# Patient Record
Sex: MALE | Race: WHITE | NOT HISPANIC OR LATINO | Employment: FULL TIME | URBAN - NONMETROPOLITAN AREA
[De-identification: names, ages, dates, MRNs, and addresses within clinical notes are randomized per-mention and may not be internally consistent; named-entity substitution may affect disease eponyms.]

---

## 2018-01-31 ENCOUNTER — OFFICE VISIT CONVERTED (OUTPATIENT)
Dept: FAMILY MEDICINE CLINIC | Age: 38
End: 2018-01-31
Attending: NURSE PRACTITIONER

## 2019-11-19 ENCOUNTER — OFFICE VISIT CONVERTED (OUTPATIENT)
Dept: FAMILY MEDICINE CLINIC | Age: 39
End: 2019-11-19
Attending: FAMILY MEDICINE

## 2019-11-27 ENCOUNTER — CONVERSION ENCOUNTER (OUTPATIENT)
Dept: CARDIOLOGY | Facility: CLINIC | Age: 39
End: 2019-11-27
Attending: INTERNAL MEDICINE

## 2020-11-17 ENCOUNTER — HOSPITAL ENCOUNTER (OUTPATIENT)
Dept: OTHER | Facility: HOSPITAL | Age: 40
Discharge: HOME | End: 2020-11-17
Attending: FAMILY MEDICINE

## 2020-11-17 ENCOUNTER — OFFICE VISIT CONVERTED (OUTPATIENT)
Dept: FAMILY MEDICINE CLINIC | Age: 40
End: 2020-11-17
Attending: FAMILY MEDICINE

## 2020-11-17 LAB
ALBUMIN SERPL-MCNC: 4.2 G/DL (ref 3.5–5)
ALBUMIN/GLOB SERPL: 1.7 {RATIO} (ref 1.4–2.6)
ALP SERPL-CCNC: 55 U/L (ref 53–128)
ALT SERPL-CCNC: 37 U/L (ref 10–40)
ANION GAP SERPL CALC-SCNC: 17 MMOL/L (ref 8–19)
APPEARANCE UR: CLEAR
AST SERPL-CCNC: 32 U/L (ref 15–50)
BACTERIA UR QL AUTO: ABNORMAL
BILIRUB SERPL-MCNC: 0.38 MG/DL (ref 0.2–1.3)
BILIRUB UR QL: NEGATIVE
BUN SERPL-MCNC: 24 MG/DL (ref 5–25)
BUN/CREAT SERPL: 20 {RATIO} (ref 6–20)
CALCIUM SERPL-MCNC: 8.9 MG/DL (ref 8.7–10.4)
CASTS URNS QL MICRO: ABNORMAL /[LPF]
CHLORIDE SERPL-SCNC: 107 MMOL/L (ref 99–111)
CHOLEST SERPL-MCNC: 130 MG/DL (ref 107–200)
CHOLEST/HDLC SERPL: 2.8 {RATIO} (ref 3–6)
COLOR UR: YELLOW
CONV CO2: 21 MMOL/L (ref 22–32)
CONV LEUKOCYTE ESTERASE: NEGATIVE
CONV TOTAL PROTEIN: 6.7 G/DL (ref 6.3–8.2)
CONV UROBILINOGEN IN URINE BY AUTOMATED TEST STRIP: 0.2 {EHRLICHU}/DL (ref 0.1–1)
CREAT UR-MCNC: 1.19 MG/DL (ref 0.7–1.2)
EPI CELLS #/AREA URNS HPF: ABNORMAL /[HPF]
ERYTHROCYTE [DISTWIDTH] IN BLOOD BY AUTOMATED COUNT: 12.4 % (ref 11.5–14.5)
GFR SERPLBLD BASED ON 1.73 SQ M-ARVRAT: >60 ML/MIN/{1.73_M2}
GLOBULIN UR ELPH-MCNC: 2.5 G/DL (ref 2–3.5)
GLUCOSE 24H UR-MCNC: NEGATIVE MG/DL
GLUCOSE SERPL-MCNC: 87 MG/DL (ref 70–99)
HBA1C MFR BLD: 16.3 G/DL (ref 14–18)
HCT VFR BLD AUTO: 47.6 % (ref 42–52)
HDLC SERPL-MCNC: 47 MG/DL (ref 40–60)
HGB UR QL STRIP: NEGATIVE
KETONES UR QL STRIP: NEGATIVE MG/DL
LDLC SERPL CALC-MCNC: 67 MG/DL (ref 70–100)
MCH RBC QN AUTO: 30.3 PG (ref 27–31)
MCHC RBC AUTO-ENTMCNC: 34.2 G/DL (ref 33–37)
MCV RBC AUTO: 88.5 FL (ref 80–96)
MUCOUS THREADS URNS QL MICRO: ABNORMAL
NITRITE UR-MCNC: NEGATIVE MG/ML
OSMOLALITY SERPL CALC.SUM OF ELEC: 295 MOSM/KG (ref 273–304)
PH UR STRIP.AUTO: 7 [PH] (ref 5–8)
PLATELET # BLD AUTO: 172 10*3/UL (ref 130–400)
PMV BLD AUTO: 9.4 FL (ref 7.4–10.4)
POTASSIUM SERPL-SCNC: 4.3 MMOL/L (ref 3.5–5.3)
PROT UR-MCNC: NEGATIVE MG/DL
PSA SERPL-MCNC: 0.74 NG/ML (ref 0–4)
RBC # BLD AUTO: 5.38 10*6/UL (ref 4.7–6.1)
RBC # BLD AUTO: ABNORMAL /[HPF]
SODIUM SERPL-SCNC: 141 MMOL/L (ref 135–147)
SP GR UR STRIP: >=1.03 (ref 1–1.03)
SPECIMEN SOURCE: ABNORMAL
TESTOST SERPL-MCNC: 995 NG/DL (ref 249–836)
TRIGL SERPL-MCNC: 81 MG/DL (ref 40–150)
TSH SERPL-ACNC: 1.76 M[IU]/L (ref 0.27–4.2)
UNIDENT CRYS URNS QL MICRO: ABNORMAL /[HPF]
VLDLC SERPL-MCNC: 16 MG/DL (ref 5–37)
WBC # BLD AUTO: 3.85 10*3/UL (ref 4.8–10.8)
WBC #/AREA URNS HPF: ABNORMAL /[HPF]

## 2020-11-19 ENCOUNTER — HOSPITAL ENCOUNTER (OUTPATIENT)
Dept: OTHER | Facility: HOSPITAL | Age: 40
Discharge: HOME | End: 2020-11-19
Attending: FAMILY MEDICINE

## 2021-01-13 ENCOUNTER — OFFICE VISIT CONVERTED (OUTPATIENT)
Dept: OTOLARYNGOLOGY | Facility: CLINIC | Age: 41
End: 2021-01-13
Attending: OTOLARYNGOLOGY

## 2021-04-01 ENCOUNTER — OFFICE VISIT CONVERTED (OUTPATIENT)
Dept: FAMILY MEDICINE CLINIC | Age: 41
End: 2021-04-01
Attending: NURSE PRACTITIONER

## 2021-05-10 ENCOUNTER — HOSPITAL ENCOUNTER (OUTPATIENT)
Dept: OTHER | Facility: HOSPITAL | Age: 41
Discharge: HOME | End: 2021-05-10
Attending: FAMILY MEDICINE

## 2021-05-10 ENCOUNTER — OFFICE VISIT CONVERTED (OUTPATIENT)
Dept: FAMILY MEDICINE CLINIC | Age: 41
End: 2021-05-10
Attending: FAMILY MEDICINE

## 2021-05-10 LAB
CONV HIV-1/ HIV-2: NONREACTIVE
RPR SER QL: NORMAL
TRICHOMONAS, POC: NORMAL

## 2021-05-10 NOTE — H&P
History and Physical      Patient Name: Latrell Morales   Patient ID: [id number]   Sex: Male   YOB: 1980    Primary Care Provider: Raffi Pierson MD   Referring Provider: Raffi Pierson MD    Visit Date: January 13, 2021    Provider: Mateo Cortez MD   Location: [facility]   Location Address: [address] [place] [address]  [address] [place] [address]  [id number]   Location Phone: [phone]          Chief Complaint     1.  Thyroid nodule       History Of Present Illness  Latrell Morales is a 40 year old /White male who presents to the office today as a consult from Raffi Pierson MD.      He presents the clinic today for evaluation of a thyroid nodule that was discovered on physical exam.  The patient has had no symptoms from this, denies a family history of thyroid issues or cancer, and has never had any radiation exposure.  He denies any throat discomfort, voice changes, or difficulty or pain with swallowing.  An ultrasound was obtained and I reviewed this with him today.  This shows a normal-sized thyroid with a 6 mm nodule along the right side of the isthmus which was rated as a T RADS 3 or 4 lesion.  Recommendation was made for repeat ultrasound in 1 year.  His TSH levels were within normal limits on recent blood work.  The patient denies any other ear nose and throat issues.       Past Medical History  Thyroid nodule         Past Surgical History  Knee surgery         Allergy List  Latex         Family Medical History  *No Known Family History         Social History  Tobacco (Never)         Review of Systems  · Constitutional  o Denies  o : fever, night sweats, weight loss  · Eyes  o Denies  o : discharge from eye, impaired vision  · HENT  o Admits  o : *See HPI  · Cardiovascular  o Denies  o : chest pain, irregular heart beats  · Respiratory  o Denies  o : shortness of breath, wheezing, coughing up blood  · Gastrointestinal  o Denies  o : heartburn, reflux,  "vomiting blood  · Genitourinary  o Denies  o : frequency  · Integument  o Denies  o : rash, skin dryness  · Neurologic  o Denies  o : seizures, loss of balance, loss of consciousness, dizziness  · Endocrine  o Denies  o : cold intolerance, heat intolerance  · Heme-Lymph  o Denies  o : easy bleeding, anemia      Vitals  Date Time BP Position Site L\R Cuff Size HR RR TEMP (F) WT  HT  BMI kg/m2 BSA m2 O2 Sat FR L/min FiO2 HC       01/13/2021 10:57 AM        97.6 234lbs 2oz 5'  9.5\" 34.08 2.28             Physical Examination  · Constitutional  o Appearance  o : well developed, well-nourished, alert and in no acute distress, voice clear and strong  · Head and Face  o Head  o :   § Inspection  § : no deformities or lesions  o Face  o :   § Inspection  § : No facial lesions; House-Brackmann I/VI bilaterally  § Palpation  § : No TMJ crepitus nor  muscle tenderness bilaterally  · Eyes  o Vision  o :   § Visual Fields  § : Extraocular movements are intact. No spontaneous or gaze-induced nystagmus.  o Conjunctivae  o : clear  o Sclerae  o : clear  o Pupils and Irises  o : pupils equal, round, and reactive to light.   · Ears, Nose, Mouth and Throat  o Ears  o :   § External Ears  § : appearance within normal limits, no lesions present  § Otoscopic Examination  § : tympanic membrane appearance within normal limits bilaterally without perforations, well-aerated middle ears  § Hearing  § : intact to conversational voice both ears  o Nose  o :   § External Nose  § : appearance normal  § Intranasal Exam  § : mucosa within normal limits, vestibules normal, no intranasal lesions present, septum midline, sinuses non tender to percussion  o Oral Cavity  o :   § Oral Mucosa  § : oral mucosa normal without pallor or cyanosis  § Lips  § : lip appearance normal  § Teeth  § : normal dentition for age  § Gums  § : gums pink, non-swollen, no bleeding present  § Tongue  § : tongue appearance normal; normal mobility  § Palate  § : " hard palate normal, soft palate appearance normal with symmetric mobility  o Throat  o :   § Oropharynx  § : no inflammation or lesions present, tonsils within normal limits  § Hypopharynx  § : appearance within normal limits, superior epiglottis within normal limits  § Larynx  § : appearance within normal limits, vocal cords within normal limits, no lesions present  · Neck  o Inspection/Palpation  o : normal appearance, no masses or tenderness, trachea midline; thyroid size normal, nontender, nodule small, soft  · Respiratory  o Respiratory Effort  o : breathing unlabored  o Inspection of Chest  o : normal appearance, no retractions  · Cardiovascular  o Heart  o : regular rate and rhythm  · Lymphatic  o Neck  o : no lymphadenopathy present  o Supraclavicular Nodes  o : no lymphadenopathy present  o Preauricular Nodes  o : no lymphadenopathy present  · Skin and Subcutaneous Tissue  o General Inspection  o : Regarding face and neck - there are no rashes present, no lesions present, and no areas of discoloration  · Neurologic  o Cranial Nerves  o : cranial nerves II-XII are grossly intact bilaterally  o Gait and Station  o : normal gait, able to stand without diffculty  · Psychiatric  o Judgement and Insight  o : judgment and insight intact  o Mood and Affect  o : mood normal, affect appropriate          Assessment  · Thyroid nodule     241.0/E04.1      Plan  · Orders  o Ultrasound Thyroid. (99861) - 241.0/E04.1 - 01/13/2022  · Medications  o Medications have been Reconciled  o Transition of Care or Provider Policy  · Instructions  o He presents the clinic today for evaluation of a thyroid nodule that was discovered on physical exam. The patient has had no symptoms from this, denies a family history of thyroid issues or cancer, and has never had any radiation exposure. He denies any throat discomfort, voice changes, or difficulty or pain with swallowing. An ultrasound was obtained and I reviewed this with him today.  This shows a normal-sized thyroid with a 6 mm nodule along the right side of the isthmus which was rated as a T RADS 3 or 4 lesion. Recommendation was made for repeat ultrasound in 1 year. His TSH levels were within normal limits on recent blood work. On exam, the nodule is soft and nontender. In discussing this with him I recommended ultrasound to be repeated in 1 year, and have ordered this for him. I have asked him to do self exams, and to contact me should there be any changes. If the ultrasound is stable, the nodule can simply be followed with several ultrasounds a year apart, or I will be glad to see him back in the clinic if there are any changes.  o Electronically Identified Patient Education Materials Provided Electronically  · Correspondence  o ENT Letter to Referring MD (Raffi Pierson MD) - 01/13/2021            Electronically Signed by: Mateo Cortez MD -Author on January 13, 2021 11:32:27 AM

## 2021-05-11 LAB
CONV HEPATITIS B SURFACE AG W CONFIRMATION RE: NEGATIVE
HAV IGM SERPL QL IA: NEGATIVE
HBV CORE IGM SERPL QL IA: NEGATIVE
HCV AB SER DONR QL: <0.1 S/CO RATIO (ref 0–0.9)
HSV1 IGG SER IA-ACNC: <0.91 INDEX (ref 0–0.9)
HSV2 IGG SER IA-ACNC: <0.91 INDEX (ref 0–0.9)

## 2021-05-13 LAB
C TRACH RRNA CVX QL NAA+PROBE: NEGATIVE
N GONORRHOEA DNA SPEC QL NAA+PROBE: NEGATIVE

## 2021-05-14 VITALS — BODY MASS INDEX: 34.67 KG/M2 | WEIGHT: 234.12 LBS | TEMPERATURE: 97.6 F | HEIGHT: 69 IN

## 2021-05-18 NOTE — PROGRESS NOTES
Latrell Morales  1980     Office/Outpatient Visit    Visit Date:  01:25 pm    Provider: Raffi Pierson MD (Assistant: Marjorie Lion MA)    Location: [facility]        Electronically signed by Raffi Pierson MD on  2020 06:30:55 AM                             Subjective:        CC: physical exam    HPI:       Latrell is in today for wellness exam.  He is 40 years old and works for [employer] where he has been for 22 years.  He does not smoke.  He uses minimal alcohol.  He does not take any routine medications.  He did have partial knee replacement last year.    ROS:     CONSTITUTIONAL:  Negative for chills and fever.      CARDIOVASCULAR:  Negative for chest pain and palpitations.      RESPIRATORY:  Negative for recent cough and dyspnea.      GASTROINTESTINAL:  Negative for abdominal pain, nausea and vomiting.      INTEGUMENTARY:  Negative for atypical mole(s) and rash.          Past Medical History / Family History / Social History:         Last Reviewed on 2020 01:32 PM by Raffi Pierson    Past Medical History:     UNREMARKABLE         PREVENTIVE HEALTH MAINTENANCE             COLONOSCOPY: has never been done and has no medical need for one at this time     EYE EXAM: was last done 2016 glasses, [facility]     INFLUENZA VACCINE: has never been done declines         Surgical History:         Positive for    Vasectomy;     Positive for    L KNEE Xfive;; Titanium plate and screws         Family History:     Father:  at age 50; Cause of death was suicide     Mother: Healthy     Sister(s): Healthy; 1 sister(s) total     Paternal Grandfather:  at age 64; Cause of death was MI     Paternal Grandmother:  at age 80's     Maternal Grandfather:  at age 60's; Cause of death was CHF     Maternal Grandmother: Coronary Artery Disease         Social History:     Occupation: [employer]     Marital Status:      Children: 2 children      Hobbies/Recreation: he enjoys weight lifting;         Tobacco/Alcohol/Supplements:     Last Reviewed on 11/17/2020 01:32 PM by Raffi Pierson    Tobacco: He has never smoked.          Alcohol: Frequency:    RARE;         Substance Abuse History:     Last Reviewed on 11/17/2020 01:32 PM by Raffi Pierson    None         Mental Health History:     Last Reviewed on 11/17/2020 01:32 PM by Raffi Pierson    NEGATIVE         Communicable Diseases (eg STDs):     Last Reviewed on 11/17/2020 01:32 PM by Raffi Pierson    Reportable health conditions; NEGATIVE         Current Problems:     Last Reviewed on 11/17/2020 01:32 PM by Raffi Pierson    Abnormal electrocardiogram [ECG] [EKG]    Encounter for general adult medical examination with abnormal findings        Immunizations:     Boostrix (Tdap) 5/3/2017        Allergies:     Last Reviewed on 11/17/2020 01:32 PM by Raffi Pierson    No Known Allergies.        Current Medications:     Last Reviewed on 11/17/2020 01:32 PM by Raffi Pierson    None        Objective:        Vitals:         Current: 11/17/2020 1:28:41 PM    Ht:  5 ft, 9 in;  Wt: 226.4 lbs;  BMI: 33.4T: 97 F (temporal);  BP: 131/76 mm Hg (left arm, sitting);  P: 80 bpm (left arm (BP Cuff), sitting);  sCr: 1.14 mg/dL;  GFR: 90.53        Exams:     PHYSICAL EXAM:     GENERAL: Vitals recorded well developed, well nourished;     EYES: extraocular movements intact; conjunctiva and cornea are normal; PERRL;     E/N/T: EARS:  normal external auditory canals and tympanic membranes;  grossly normal hearing; OROPHARYNX:  normal mucosa, dentition, gingiva, and posterior pharynx;     NECK: range of motion is normal; thyroid exam reveals left lobe enlargement;     RESPIRATORY: normal respiratory rate and pattern with no distress; normal breath sounds with no rales, rhonchi, wheezes or rubs;     CARDIOVASCULAR: normal rate; rhythm is regular;  no systolic murmur;      GASTROINTESTINAL: nontender; normal bowel sounds; no masses;     LYMPHATIC: no enlargement of cervical or facial nodes; no supraclavicular nodes;     SKIN:  no significant rashes or lesions; no suspicious moles;     NEUROLOGIC: mental status: alert and oriented x 3; cranial nerves II-XII grossly intact;     PSYCHIATRIC: appropriate affect and demeanor; normal psychomotor function;         Assessment:         Z00.00   Encounter for general adult medical examination without abnormal findings       E04.9   Nontoxic goiter, unspecified           ORDERS:         Radiology/Test Orders:       [id number]  US, soft tissues of head & neck (eg, thyroid, parathyroid, parotid), real time w/image documentation  (Send-Out)              Lab Orders:       [id number]  BD2 - [facility] CBC w/o diff  (Send-Out)            63597  COMP [facility] Comp. Metabolic Panel  (Send-Out)            *  [employer] Medicare screening PSA  (Send-Out)            76967  TSH - [facility] TSH  (Send-Out)            75943  LPDP - [facility] Lipid Panel  (Send-Out)            40121  TESTB - [facility] Testosterone, total  (Send-Out)            00313  BDUAM - [facility] Urinalysis, automated, with micro  (Send-Out)                      Plan:         Encounter for general adult medical examination without abnormal findings    LABORATORY:  Labs ordered to be performed today include CBC W/O DIFF, Comprehensive metabolic panel, lipid panel, PSA, Testosterone total, TSH, and urinalysis with micro.      RECOMMENDATIONS given include: Latrell is in good health overall.  I'm going to advise no specific changes.  We will update labs for him as noted below and follow from there.  He is in excellent overall health.  The thyroid is to be checked as noted..  MIPS Vaccines Flu and Pneumonia updated in Shot record           Orders:       [id number]  [facility] - [facility] CBC w/o diff  (Send-Out)            42478  COMP [facility] Comp. Metabolic Panel  (Send-Out)            *  [employer] Medicare screening PSA  (Send-Out)            31989   TSH - [facility] TSH  (Send-Out)            66864  LPDP - [facility] Lipid Panel  (Send-Out)            19992  TESTB - [facility] Testosterone, total  (Send-Out)            09381  BDUAM - [facility] Urinalysis, automated, with micro  (Send-Out)              Nontoxic goiter, unspecified        RADIOLOGY:  I have ordered Thyroid Ultrasound to be done today.            Orders:       [id number]  US, soft tissues of head & neck (eg, thyroid, parathyroid, parotid), real time w/image documentation  (Send-Out)                  Charge Capture:         Primary Diagnosis:     Z00.00  Encounter for general adult medical examination without abnormal findings           Orders:      70994  Preventive medicine, established patient, age 40-64 years  (In-House)              E04.9  Nontoxic goiter, unspecified

## 2021-05-18 NOTE — PROGRESS NOTES
Latrell Morales PEPE  1980     Office/Outpatient Visit    Visit Date: Mon, May 10, 2021 12:59 pm    Provider: Raffi Pierson MD (Assistant: Lucille Shore RN)    Location: [facility]        Electronically signed by Raffi Pierson MD on  05/12/2021 01:30:00 PM                             Subjective:        CC: anxiety    HPI:       Latrell is in today for evaluation of anxiety and some depression.  He has been through a personal struggle and may be looking at a divorce.  They have been  for 18 years and have two children - 14 and 16 years old.  He says that he has anger issues and some issues with crying and feeling down.  He feels 'all over the place'.  He did have some really bad days there for a while.  He says that his father did commit suicide.  Latrell did not seriously consider suicide, but he had some 'really low days'.  He is not sleeping well.          Latrell has noted a bump on the undersurface of his penis.  He says that this was painful initially.  It almost seemed like a 'blood blister'.  He has never had any kind of sexually transmitted infection.  He is without acute concern otherwise.    ROS:     CONSTITUTIONAL:  Negative for chills and fever.      CARDIOVASCULAR:  Negative for chest pain and palpitations.      RESPIRATORY:  Negative for recent cough and dyspnea.      GASTROINTESTINAL:  Negative for abdominal pain, nausea and vomiting.      INTEGUMENTARY:  Negative for atypical mole(s) and rash.          Past Medical History / Family History / Social History:         Last Reviewed on 5/10/2021 01:25 PM by Raffi Pierson    Past Medical History:     UNREMARKABLE         PREVENTIVE HEALTH MAINTENANCE             COLONOSCOPY: has never been done and has no medical need for one at this time     EYE EXAM: was last done 2016 glasses, [facility]     INFLUENZA VACCINE: has never been done declines         Surgical History:         Positive for    Vasectomy;     Positive for     L KNEE Xfive;; Titanium plate and screws         Family History:     Father:  at age 50; Cause of death was suicide     Mother: Healthy     Sister(s): Healthy; 1 sister(s) total     Paternal Grandfather:  at age 64; Cause of death was MI     Paternal Grandmother:  at age 80's     Maternal Grandfather:  at age 60's; Cause of death was CHF     Maternal Grandmother: Coronary Artery Disease         Social History:     Occupation: [employer]     Marital Status:      Children: 2 children     Hobbies/Recreation: he enjoys weight lifting;         Tobacco/Alcohol/Supplements:     Last Reviewed on 5/10/2021 01:25 PM by Raffi Pierson    Tobacco: He has never smoked.          Alcohol: Frequency:    RARE;         Substance Abuse History:     Last Reviewed on 5/10/2021 01:25 PM by Raffi Pierson    None         Mental Health History:     Last Reviewed on 5/10/2021 01:25 PM by Raffi Pierson    NEGATIVE         Communicable Diseases (eg STDs):     Last Reviewed on 5/10/2021 01:25 PM by Raffi Pierson    Reportable health conditions; NEGATIVE         Current Problems:     Last Reviewed on 5/10/2021 01:25 PM by Raffi Pierson    Abnormal electrocardiogram [ECG] [EKG]    Encounter for general adult medical examination with abnormal findings    Encounter for general adult medical examination without abnormal findings    Nontoxic goiter, unspecified    Abnormal level of hormones in specimens from other organs, systems and tissues    Insomnia, unspecified        Immunizations:     Boostrix (Tdap) 5/3/2017        Allergies:     Last Reviewed on 5/10/2021 01:25 PM by Raffi Pierson    No Known Allergies.        Current Medications:     Last Reviewed on 5/10/2021 01:25 PM by Raffi Pierson    hydrOXYzine HCL 25 mg oral tablet [take 1or 2 tablets q hs prn insomnia or anxiety]        Objective:        Vitals:         Current: 5/10/2021 1:03:39 PM    Ht:  5  ft, 9 in;  Wt: 215.4 lbs;  BMI: 31.8T: 97.3 F (temporal);  BP: 117/72 mm Hg (right arm, sitting);  P: 97 bpm (right arm (BP Cuff), sitting);  sCr: 1.19 mg/dL;  GFR: 84.91        Exams:     PHYSICAL EXAM:     GENERAL: Vitals recorded well developed, well nourished;     EYES: extraocular movements intact; conjunctiva and cornea are normal; PERRL;     NECK: range of motion is normal; thyroid is non-palpable;     RESPIRATORY: normal respiratory rate and pattern with no distress; normal breath sounds with no rales, rhonchi, wheezes or rubs;     CARDIOVASCULAR: normal rate; rhythm is regular;  no systolic murmur;     GASTROINTESTINAL: nontender; normal bowel sounds; no masses;     LYMPHATIC: no enlargement of cervical or facial nodes; no supraclavicular nodes;     SKIN: there is a single ulcer noted on the shaft of the penis - there is no other rash noted;     NEUROLOGIC: mental status: alert and oriented x 3; cranial nerves II-XII grossly intact;     PSYCHIATRIC: appropriate affect and demeanor; normal psychomotor function;         Assessment:         F41.8   Other specified anxiety disorders       Z72.51   High risk heterosexual behavior           ORDERS:         Meds Prescribed:       [New Rx] sertraline 50 mg oral tablet [take 1 tablet (50 mg) by oral route once daily], #30 (thirty) tablets, Refills: 1 (one)       [New Rx] traZODone 50 mg oral tablet [take 1 tablet (50 mg) by oral route once daily at bedtime], #30 (thirty) tablets, Refills: 0 (zero)         Lab Orders:       [id number]  AHEP4 - [facility] Hepatitis Panel (HAAb, HbcAB, HbsAG, Hep C antibody)  (Send-Out)            70531  HIV - [facility] HIV-1 and HIV-2 Ab  (Send-Out)            40038  RPR - H RPR, Rfx Qn RPR/Confirm TP  (Send-Out)            19682  CTNGX- [facility] Chlamydia GC swab or urine (27226, 72260)  (Send-Out)            56595  HSVIG -[facility]- Herpes simplex IgG1&@ 51185 and 87002  (Send-Out)            24549  BDUTR - [facility] Urine Trichonomas  (Send-Out)                       Plan:         Other specified anxiety disorders        RECOMMENDATIONS given include: This has been a challenging situation with Latrell.  I am going to recommend daily sertraline for him as noted below.  Hopefully, this will pull his overall anxiety level.  We will also give some trazodone for night time use.  Also, he is concerned by this spot that developed on the penis.  We will move forward with additional testing as noted below given the concern.  We may also repeat testing in the next 90 days..            Prescriptions:       [New Rx] sertraline 50 mg oral tablet [take 1 tablet (50 mg) by oral route once daily], #30 (thirty) tablets, Refills: 1 (one)       [New Rx] traZODone 50 mg oral tablet [take 1 tablet (50 mg) by oral route once daily at bedtime], #30 (thirty) tablets, Refills: 0 (zero)         High risk heterosexual behavior    LABORATORY:  Labs ordered to be performed today include STD Testing: Hepatitis panel HIV I and HIV 2 Ab RPR Chlamydia/GC H Herpes type I and II IgG index value on each type Urine Trichomonas.            Orders:       [id number]  AHEP4 - [facility] Hepatitis Panel (HAAb, HbcAB, HbsAG, Hep C antibody)  (Send-Out)            39338  HIV - [facility] HIV-1 and HIV-2 Ab  (Send-Out)            10519  RPR - H RPR, Rfx Qn RPR/Confirm TP  (Send-Out)            79127  CTNGX- [facility] Chlamydia GC swab or urine (97059, 74316)  (Send-Out)            96921  HSVIG -[facility]- Herpes simplex IgG1&@ 07580 and 42718  (Send-Out)            11528  BDUTR - [facility] Urine Trichonomas  (Send-Out)                  Charge Capture:         Primary Diagnosis:     F41.8  Other specified anxiety disorders           Orders:      02777  Office/outpatient visit; established patient, level 4  (In-House)              Z72.51  High risk heterosexual behavior

## 2021-05-18 NOTE — PROGRESS NOTES
Latrell Morales  1980     Office/Outpatient Visit    Visit Date: u, Apr 1, 2021 09:39 am    Provider: Gris Vyas N.P. (Assistant: Adia Calabrese LPN)    Location: [facility]        Electronically signed by Gris Vyas N.P. on  04/01/2021 08:29:12 PM                             Subjective:        CC: Mr. Morales is a 40 year old White male.  He presents with insomnia.          HPI:           Insomnia, unspecified noted.  This has been noted for the past 3 weeks.  Sleep has been disrupted by a delay in initiation of sleep and frequent awakenings.  On average, he estimates that he gets 2-3 hours of sleep per night.  Associated symptoms include anxiety.  He denies symptoms of agitation, depression or manic symptoms.  Current stressors include marital discord, shift change.  Medical history is positive for anxiety/stress.  Current medications include melatonin.  Remedies that he has already tried include OTC sleep aids.      ROS:     CONSTITUTIONAL:  Positive for fatigue.   Negative for chills or fever.      CARDIOVASCULAR:  Negative for chest pain, palpitations, tachycardia, orthopnea, and edema.      RESPIRATORY:  Negative for cough, dyspnea, and hemoptysis.      MUSCULOSKELETAL:  Negative for arthralgias, back pain, and myalgias.      PSYCHIATRIC:  Positive for anxiety, feelings of stress and insomnia.   Negative for depression or suicidal thoughts.          Past Medical History / Family History / Social History:         Last Reviewed on 4/01/2021 09:45 AM by Gris Vyas    Past Medical History:     UNREMARKABLE         PREVENTIVE HEALTH MAINTENANCE             COLONOSCOPY: has never been done and has no medical need for one at this time     EYE EXAM: was last done 2016 glasses, [facility]     INFLUENZA VACCINE: has never been done declines         Surgical History:         Positive for    Vasectomy;     Positive for    L KNEE Xfive;; Titanium plate and screws          Family History:     Father:  at age 50; Cause of death was suicide     Mother: Healthy     Sister(s): Healthy; 1 sister(s) total     Paternal Grandfather:  at age 64; Cause of death was MI     Paternal Grandmother:  at age 80's     Maternal Grandfather:  at age 60's; Cause of death was CHF     Maternal Grandmother: Coronary Artery Disease         Social History:     Occupation: [employer]     Marital Status:      Children: 2 children     Hobbies/Recreation: he enjoys weight lifting;         Tobacco/Alcohol/Supplements:     Last Reviewed on 2021 09:39 AM by Adia Calabrese    Tobacco: He has never smoked.          Alcohol: Frequency:    RARE;         Substance Abuse History:     Last Reviewed on 2020 01:32 PM by Raffi Pierson    None         Mental Health History:     Last Reviewed on 2020 01:32 PM by Raffi Pierson    NEGATIVE         Communicable Diseases (eg STDs):     Last Reviewed on 2020 01:32 PM by Raffi Pierson    Reportable health conditions; NEGATIVE         Current Problems:     Last Reviewed on 2021 08:28 PM by Gris Vyas    Abnormal electrocardiogram [ECG] [EKG]    Encounter for general adult medical examination with abnormal findings    Encounter for general adult medical examination without abnormal findings    Nontoxic goiter, unspecified    Abnormal level of hormones in specimens from other organs, systems and tissues    Insomnia, unspecified        Immunizations:     Boostrix (Tdap) 5/3/2017        Allergies:     Last Reviewed on 2020 01:32 PM by Raffi Pierson    No Known Allergies.        Current Medications:     Last Reviewed on 2021 09:39 AM by Adia Calabrese    None        Objective:        Vitals:         Historical:     2020  BP:   131/76 mm Hg ( (left arm, , sitting, );) 2020  Wt:   226.4lbs    Current: 2021 9:41:08 AM    Ht:  5 ft, 9 in;  Wt: 222.8 lbs;   BMI: 32.9T: 96.4 F (temporal);  BP: 136/67 mm Hg (left arm, sitting);  P: 95 bpm (left arm (BP Cuff), sitting);  sCr: 1.19 mg/dL;  GFR: 86.14        Exams:     PHYSICAL EXAM:     GENERAL:  well developed and nourished; appropriately groomed; in no apparent distress;     NECK: thyroid is non-palpable;     RESPIRATORY: normal respiratory rate and pattern with no distress; normal breath sounds with no rales, rhonchi, wheezes or rubs;     CARDIOVASCULAR: normal rate; rhythm is regular;     MUSCULOSKELETAL:  Normal range of motion, strength and tone;     NEUROLOGIC: mental status: alert and oriented x 3; GROSSLY INTACT     PSYCHIATRIC:  appropriate affect and demeanor; normal speech pattern; grossly normal memory;         Assessment:         G47.00   Insomnia, unspecified           ORDERS:         Meds Prescribed:       [New Rx] hydrOXYzine HCL 25 mg oral tablet [take 1or 2 tablets q hs prn insomnia or anxiety], #60 (sixty) tablets, Refills: 1 (one)                 Plan:         Insomnia, unspecified        RECOMMENDATIONS given include: avoid alcohol, avoid caffeine, adhere to a proper sleep hygiene schedule, reduce stress, and here with wife.  they are attending counseling which is a good idea.  follow up if not improivng..            Prescriptions:       [New Rx] hydrOXYzine HCL 25 mg oral tablet [take 1or 2 tablets q hs prn insomnia or anxiety], #60 (sixty) tablets, Refills: 1 (one)             Patient Recommendations:        For  Insomnia, unspecified:    Avoid or reduce consumption of alcohol.  Although you may initially feel sleepy after a drinking, this effect wears off and can lead to night-time awakening. Avoid caffeine as much as possible.  Quit all sources of caffeine, if possible; otherwise, do not drink coffee, tea, or sodas past the early afternoon hours. Avoid naps.  Go to bed only when tired.  Use the bed only for sleep and sex- not for eating, TV watching, or other activities.  Maintain a regular sleep  and wake schedule. Stress and anxiety frequently interfere with sleep.  Try exercise, meditation, biofeedback, or counseling to help reduce stress.  Call to discuss anti-anxiety medications if these methods do not help.              Charge Capture:         Primary Diagnosis:     G47.00  Insomnia, unspecified           Orders:      [id number]  Office/outpatient visit; established patient, level 3  (In-House)

## 2021-05-18 NOTE — PROGRESS NOTES
Latrell Morales  1980     Office/Outpatient Visit    Visit Date: Tue, Nov 19, 2019 01:08 pm    Provider: Raffi Pierson MD (Assistant: Lucille Shore RN)    Location: [facility]        Electronically signed by Raffi Pierson MD on  11/21/2019 12:25:11 PM                             Subjective:        CC: physical exam prior to surgery    HPI:       Latrell is in today for wellness exam prior to surgery.  He is scheduled to have L partial knee replacement on December 10.  He had some preop testing and was referred her for guidance.  His blood work is basically normal.  His hemoglobin level is mildly elevated.  He has used testosterone off and on for about 20 years.  He is currently using testosterone at about 300mg per week.  He did have some EKG abnormalities on recent evaluation including some NS T wave inversion in the inferior leads.  The QRS complexes are also a little prominent.  He is not having obvious cardiac related symptoms.  He denies significant shortness of breath.  He says that his cardio is not as good as it used to be.      ROS:     CONSTITUTIONAL:  Negative for chills and fever.      EYES:  Negative for blurred vision and eye pain.      E/N/T:  Negative for nasal congestion and sore throat.      CARDIOVASCULAR:  Negative for chest pain and palpitations.      RESPIRATORY:  Negative for recent cough and dyspnea.      GASTROINTESTINAL:  Negative for abdominal pain, nausea and vomiting.      GENITOURINARY:  Negative for dysuria and hematuria.      MUSCULOSKELETAL:  Positive for arthralgias (L knee).   Negative for myalgias.      INTEGUMENTARY:  Negative for atypical mole(s) and rash.      NEUROLOGICAL:  Negative for paresthesias and weakness.      HEMATOLOGIC/LYMPHATIC:  Negative for easy bruising and excessive bleeding.      ALLERGIC/IMMUNOLOGIC:  Negative for seasonal allergies and perennial allergies.      PSYCHIATRIC:  Negative for anxiety and depression.          Past  Medical History / Family History / Social History:         Last Reviewed on 2019 01:41 PM by Raffi Pierson    Past Medical History:     UNREMARKABLE         PREVENTIVE HEALTH MAINTENANCE             COLONOSCOPY: has never been done and has no medical need for one at this time     EYE EXAM: was last done 2016 glasses, [facility]     INFLUENZA VACCINE: has never been done declines         Surgical History:         Positive for    Vasectomy;     Positive for    L KNEE Xfive;; Titanium plate and screws         Family History:     Father:  at age 50; Cause of death was suicide     Mother: Healthy     Sister(s): Healthy; 1 sister(s) total     Paternal Grandfather:  at age 64; Cause of death was MI     Paternal Grandmother:  at age 80's     Maternal Grandfather:  at age 60's; Cause of death was CHF     Maternal Grandmother: Coronary Artery Disease         Social History:     Occupation: [employer]     Marital Status:      Children: 2 children     Hobbies/Recreation: he enjoys weight lifting;         Tobacco/Alcohol/Supplements:     Last Reviewed on 2019 01:41 PM by Raffi Pierson    Tobacco: He has never smoked.          Alcohol: Frequency:    RARE;         Substance Abuse History:     Last Reviewed on 2019 01:41 PM by Raffi Pierson    None         Mental Health History:     Last Reviewed on 2019 01:41 PM by Raffi Pierson    NEGATIVE         Communicable Diseases (eg STDs):     Last Reviewed on 2019 01:41 PM by Raffi Pierson    Reportable health conditions; NEGATIVE         Current Problems:     Last Reviewed on 2019 01:41 PM by Raffi Pierson    None Recorded        Immunizations:     Boostrix (Tdap) 5/3/2017        Allergies:     Last Reviewed on 2019 01:41 PM by Raffi Pierson    No Known Allergies.        Current Medications:     Last Reviewed on 2019 01:41 PM by Raffi Pierson  Rajesh Bradford        Objective:        Vitals:         Current: 11/19/2019 1:12:36 PM    Ht:  5 ft, 9 in;  Wt: 240.6 lbs;  BMI: 35.5T: 98.2 F (oral);  BP: 126/75 mm Hg (left arm, sitting);  P: 75 bpm (left arm (BP Cuff), sitting);  sCr: 1.14 mg/dL;  GFR: 93.81        Exams:     PHYSICAL EXAM:     GENERAL: Vitals recorded well developed, well nourished;     EYES: extraocular movements intact; conjunctiva and cornea are normal; PERRL;     E/N/T: EARS:  normal external auditory canals and tympanic membranes;  grossly normal hearing; OROPHARYNX:  normal mucosa, dentition, gingiva, and posterior pharynx;     NECK: range of motion is normal; thyroid is non-palpable;     RESPIRATORY: normal respiratory rate and pattern with no distress; normal breath sounds with no rales, rhonchi, wheezes or rubs;     CARDIOVASCULAR: normal rate; rhythm is regular;  no systolic murmur;     GASTROINTESTINAL: nontender; normal bowel sounds; no masses;     LYMPHATIC: no enlargement of cervical or facial nodes; no supraclavicular nodes;     SKIN:  no significant rashes or lesions; no suspicious moles;     NEUROLOGIC: mental status: alert and oriented x 3; cranial nerves II-XII grossly intact;     PSYCHIATRIC: appropriate affect and demeanor; normal psychomotor function;         Assessment:         Z00.01   Encounter for general adult medical examination with abnormal findings       R94.31   Abnormal electrocardiogram [ECG] [EKG]           ORDERS:         Radiology/Test Orders:       [id number]  Echocardiography, transthoracic, real-time w image (2D), w M-mode, w spectral & color flow Doppler  (Send-Out)              Other Orders:         Depression screen negative  (In-House)                      Plan:         Encounter for general adult medical examination with abnormal findings        RECOMMENDATIONS given include: Today, we have reviewed Latrell's care.  We will assess his heart as noted below.  I do think echo is needed due to some findings  on EKG that might suggest left sided hypertrophy.  He does not have symptoms that would indicate heart artery blockage, but with the testosterone use, we need to make sure the heart function looks good.  We will follow from there.  We did review the potential side effects of testosterone use including heart disease, stroke, and blood clots as he ages.  There are multiple other risks as well..  MIPS PHQ-9 Depression Screening: Completed form scanned and in chart; Total Score 2; Negative Depression Screen           Orders:         Depression screen negative  (In-House)                Patient Education Handouts:       Physical Exam 30-39 year, Male          Abnormal electrocardiogram [ECG] [EKG]        TESTS/PROCEDURES:  Will proceed with ECHO to be performed/scheduled now.            Orders:       [id number]  Echocardiography, transthoracic, real-time w image (2D), w M-mode, w spectral & color flow Doppler  (Send-Out)                  Charge Capture:         Primary Diagnosis:     Z00.01  Encounter for general adult medical examination with abnormal findings           Orders:      [id number]  Preventive medicine, established patient, age 18-39 years  (In-House)              Depression screen negative  (In-House)              R94.31  Abnormal electrocardiogram [ECG] [EKG]

## 2021-05-18 NOTE — PROGRESS NOTES
Latrell MoralesOtoniel 1980     Office/Outpatient Visit    Visit Date:  08:58 am    Provider: Karissa Wilson N.P. (Assistant: Lucille Shore RN)    Location: [facility]        Electronically signed by Karissa Wilson N.P. on  2018 09:35:15 AM                             SUBJECTIVE:        CC:     Mr. Morales is a 37 year old White male.  Sinus congestion and pressure         HPI:         Patient to be evaluated for acute sinusitis, other.  This has been a problem for the past 3 days.  The pattern of symptoms is described as episodic, with unpredictable symptomatic periods.  His primary symptoms include dry cough, facial pressure, headache, nasal congestion and teeth pain and foul sinus odor.  He denies fever.  He has already tried an antihistamine and guaifenesin.  Pertinent medical history is unremarkable.      ROS:     CONSTITUTIONAL:  Negative for chills, fatigue, fever and weight change.      E/N/T:  Positive for nasal congestion and sinus pressure.   Negative for sore throat.      CARDIOVASCULAR:  Negative for chest pain, orthopnea, paroxysmal nocturnal dyspnea and pedal edema.      RESPIRATORY:  Positive for recent cough ( with scant amounts of clear or white sputum ).   Negative for dyspnea.      GASTROINTESTINAL:  Negative for abdominal pain, heartburn, constipation, diarrhea, and stool changes.          PMH/FMH/SH:     Last Reviewed on 2018 09:27 AM by Karissa Wilson    Past Medical History:     UNREMARKABLE         PREVENTIVE HEALTH MAINTENANCE             COLONOSCOPY: has never been done and has no medical need for one at this time     EYE EXAM: was last done 2016 glasses, [facility]     INFLUENZA VACCINE: has never been done declines         Surgical History:         Positive for    Vasectomy;     Positive for    L KNEE Xfive;; Titanium plate and screws         Family History:     Father:  at age 50; Cause of death was suicide     Mother: Healthy      Sister(s): Healthy; 1 sister(s) total     Paternal Grandfather:  at age 64; Cause of death was MI     Paternal Grandmother:  at age 80's     Maternal Grandfather:  at age 60's; Cause of death was CHF     Maternal Grandmother: Coronary Artery Disease         Social History:     Occupation: [employer]     Marital Status:      Children: 2 children     Hobbies/Recreation: he enjoys weight lifting;         Tobacco/Alcohol/Supplements:     Last Reviewed on 2018 09:27 AM by Karissa Wilson    Tobacco: He has never smoked.          Alcohol: Frequency:    RARE;             Current Problems:     Last Reviewed on 2018 09:27 AM by Karissa Wilson    Abnormal laboratory test findings without diagnosis     Acute sinusitis, other         Immunizations:     Boostrix (Tdap) 5/3/2017         Allergies:     Last Reviewed on 2018 09:27 AM by Karissa Wilson      No Known Drug Allergies.         Current Medications:     Last Reviewed on 2018 09:27 AM by Karissa Wilson      No Known Medications.         OBJECTIVE:        Vitals:         Current: 2018 9:02:07 AM    Ht:  5 ft, 9 in;  Wt: 233 lbs;  BMI: 34.4    T: 97.2 F (oral);  BP: 134/77 mm Hg (right arm, sitting);  P: 96 bpm (right arm (BP Cuff), sitting);  sCr: 1.14 mg/dL;  GFR: 94.33        Exams:     PHYSICAL EXAM:     GENERAL: Vitals recorded well developed, well nourished;  well groomed;  no apparent distress;     E/N/T: NOSE: turbinates are moderately swollen bilaterally;  bilateral maxillary sinus tenderness present;     RESPIRATORY: normal respiratory rate and pattern with no distress; normal breath sounds with no rales, rhonchi, wheezes or rubs;     CARDIOVASCULAR: normal rate; rhythm is regular;  normal S1; normal S2; no systolic murmur; no cyanosis; no edema;     GASTROINTESTINAL: nontender, nondistended; no hepatosplenomegaly or masses; no bruits;         ASSESSMENT:           461.8   J01.90  Acute sinusitis, other               DDx:         ORDERS:         Meds Prescribed:       Azithromycin 250mg Tablet 2 po today, 1 po x 4 days  #1 (One) tablet(s) Refills: 0                 PLAN:          Acute sinusitis, other Mucinex D 1 po bid x 4 days., and flonase that you have at home         FOLLOW-UP: Advised to call if there is no improvement Follow-up by phone if no improvement in 1 week..   Schedule follow-up appointments on a p.r.n. basis..            Prescriptions:       Azithromycin 250mg Tablet 2 po today, 1 po x 4 days  #1 (One) tablet(s) Refills: 0           Patient Education Handouts:       Sinus Infection (Sinusitis)              Patient Recommendations:        For  Acute sinusitis, other:     Follow-up by phone if no improvement in 1 week. Schedule follow-up appointments as needed.                APPOINTMENT INFORMATION:        Monday Tuesday Wednesday Thursday Friday Saturday Sunday            Time:___________________AM  PM   Date:_____________________             CHARGE CAPTURE:           Primary Diagnosis:     461.8 Acute sinusitis, other            J01.90    Acute sinusitis, unspecified              Orders:          [id number]   Office/outpatient visit; established patient, level 3  (In-House)

## 2021-06-08 DIAGNOSIS — E04.1 THYROID NODULE: Primary | ICD-10-CM

## 2021-07-01 VITALS
WEIGHT: 240.6 LBS | BODY MASS INDEX: 35.63 KG/M2 | TEMPERATURE: 98.2 F | HEIGHT: 69 IN | DIASTOLIC BLOOD PRESSURE: 75 MMHG | SYSTOLIC BLOOD PRESSURE: 126 MMHG | HEART RATE: 75 BPM

## 2021-07-01 VITALS
WEIGHT: 233 LBS | BODY MASS INDEX: 34.51 KG/M2 | SYSTOLIC BLOOD PRESSURE: 134 MMHG | DIASTOLIC BLOOD PRESSURE: 77 MMHG | HEIGHT: 69 IN | HEART RATE: 96 BPM | TEMPERATURE: 97.2 F

## 2021-07-02 VITALS
WEIGHT: 222.8 LBS | SYSTOLIC BLOOD PRESSURE: 136 MMHG | HEIGHT: 69 IN | BODY MASS INDEX: 33 KG/M2 | DIASTOLIC BLOOD PRESSURE: 67 MMHG | HEART RATE: 95 BPM | TEMPERATURE: 96.4 F

## 2021-07-02 VITALS
HEIGHT: 69 IN | DIASTOLIC BLOOD PRESSURE: 76 MMHG | TEMPERATURE: 97 F | WEIGHT: 226.4 LBS | SYSTOLIC BLOOD PRESSURE: 131 MMHG | HEART RATE: 80 BPM | BODY MASS INDEX: 33.53 KG/M2

## 2021-07-02 VITALS
SYSTOLIC BLOOD PRESSURE: 117 MMHG | HEIGHT: 69 IN | DIASTOLIC BLOOD PRESSURE: 72 MMHG | HEART RATE: 97 BPM | TEMPERATURE: 97.3 F | WEIGHT: 215.4 LBS | BODY MASS INDEX: 31.9 KG/M2

## 2021-07-04 DIAGNOSIS — F41.8 OTHER SPECIFIED ANXIETY DISORDERS: ICD-10-CM

## 2021-07-06 NOTE — TELEPHONE ENCOUNTER
Please see if Latrell would like to continue this current dose or if he would like me to increase.  Thanks.

## 2021-08-02 ENCOUNTER — TELEPHONE (OUTPATIENT)
Dept: FAMILY MEDICINE CLINIC | Age: 41
End: 2021-08-02

## 2021-08-02 NOTE — TELEPHONE ENCOUNTER
Pt states sertraline is causing issues with erectile dysfunction, would like to be changed to a different med without that side effect. Please advise

## 2021-08-02 NOTE — TELEPHONE ENCOUNTER
Caller: Latrell Morales    Relationship: Self    Best call back number: [phone]    What is the best time to reach you: ANYTIME    Who are you requesting to speak with (clinical staff, provider,  specific staff member): ORLANDO CARR      What was the call regarding: QUESTIONS REGARDING SIDE EFFECTS OF A MEDICATION.    Do you require a callback: YES

## 2021-08-04 RX ORDER — BUPROPION HYDROCHLORIDE 150 MG/1
150 TABLET ORAL DAILY
Qty: 30 TABLET | Refills: 0 | Status: SHIPPED | OUTPATIENT
Start: 2021-08-04 | End: 2021-08-20 | Stop reason: SDUPTHER

## 2021-08-04 NOTE — TELEPHONE ENCOUNTER
I have sent a trial of Wellbutrin for him.  He should keep his scheduled appointment with me.  Please list sertraline is causing adverse reaction, erectile dysfunction.  Thanks.

## 2021-08-20 ENCOUNTER — OFFICE VISIT (OUTPATIENT)
Dept: FAMILY MEDICINE CLINIC | Age: 41
End: 2021-08-20

## 2021-08-20 ENCOUNTER — LAB (OUTPATIENT)
Dept: LAB | Facility: HOSPITAL | Age: 41
End: 2021-08-20

## 2021-08-20 VITALS
HEART RATE: 94 BPM | BODY MASS INDEX: 33.03 KG/M2 | WEIGHT: 223 LBS | OXYGEN SATURATION: 96 % | SYSTOLIC BLOOD PRESSURE: 146 MMHG | HEIGHT: 69 IN | DIASTOLIC BLOOD PRESSURE: 77 MMHG | TEMPERATURE: 98.1 F

## 2021-08-20 DIAGNOSIS — N52.9 ERECTILE DYSFUNCTION, UNSPECIFIED ERECTILE DYSFUNCTION TYPE: ICD-10-CM

## 2021-08-20 DIAGNOSIS — F41.8 OTHER SPECIFIED ANXIETY DISORDERS: ICD-10-CM

## 2021-08-20 DIAGNOSIS — N48.9 PENILE LESION: Primary | ICD-10-CM

## 2021-08-20 DIAGNOSIS — Z72.51 HIGH RISK HETEROSEXUAL BEHAVIOR: ICD-10-CM

## 2021-08-20 PROBLEM — E04.1 THYROID NODULE: Status: ACTIVE | Noted: 2021-08-20

## 2021-08-20 LAB
HBV CORE IGM SERPL QL IA: NORMAL
HBV SURFACE AG SERPL QL IA: NORMAL
HCV AB SER DONR QL: NORMAL
HIV1+2 AB SER QL: NORMAL

## 2021-08-20 PROCEDURE — G0432 EIA HIV-1/HIV-2 SCREEN: HCPCS

## 2021-08-20 PROCEDURE — 86705 HEP B CORE ANTIBODY IGM: CPT

## 2021-08-20 PROCEDURE — 86803 HEPATITIS C AB TEST: CPT

## 2021-08-20 PROCEDURE — 87340 HEPATITIS B SURFACE AG IA: CPT

## 2021-08-20 PROCEDURE — 36415 COLL VENOUS BLD VENIPUNCTURE: CPT

## 2021-08-20 PROCEDURE — 99214 OFFICE O/P EST MOD 30 MIN: CPT | Performed by: FAMILY MEDICINE

## 2021-08-20 PROCEDURE — 86592 SYPHILIS TEST NON-TREP QUAL: CPT

## 2021-08-20 RX ORDER — BUPROPION HYDROCHLORIDE 150 MG/1
150 TABLET ORAL DAILY
Qty: 90 TABLET | Refills: 1 | Status: SHIPPED | OUTPATIENT
Start: 2021-08-20 | End: 2022-03-10

## 2021-08-20 RX ORDER — SILDENAFIL 100 MG/1
TABLET, FILM COATED ORAL
Qty: 30 TABLET | Refills: 2 | Status: SHIPPED | OUTPATIENT
Start: 2021-08-20 | End: 2022-05-02 | Stop reason: SDUPTHER

## 2021-08-20 NOTE — PROGRESS NOTES
Latrell Morales presents to [facility].    Chief Complaint:  'that spot is back on my penis'    Subjective       History of Present Illness:  Latrell is in today for evaluation of a spot on his penis.  This is something that he has noted for the last several months.  It was actually present and then resolved.  However, it is come back.  He would like to get this evaluated and then consider possibly referral for definitive treatment.    In addition, Latrell has been through kind of a whirlwind the last several months.  He was given some sertraline after his recent visit for some possible depression issues.  However he did not tolerate that well.  He is on Wellbutrin now.  He has noted some ongoing sexual dysfunction that he thinks could be related to this in some part.      Review of Systems:  Review of Systems   Constitutional: Negative for chills and fever.   Respiratory: Negative for cough and shortness of breath.    Cardiovascular: Negative for chest pain and palpitations.   Gastrointestinal: Negative for abdominal pain, nausea and vomiting.        Objective   Medical History:  Past Medical History:   • Abnormal electrocardiogram   • Abnormal level of hormones in specimens from other organs, systems and tissues   • High risk sexual behavior   • Insomnia   • Nontoxic goiter   • Other specified anxiety disorders     Past Surgical History:   • KNEE SURGERY    xfive- titanium plate and screws   • VASECTOMY      Family History   Problem Relation Age of Onset   • No Known Problems Mother    • Suicidality Father         cause of death   • No Known Problems Sister    • Coronary artery disease Maternal Grandmother    • Heart failure Maternal Grandfather         cause of death   • Heart attack Paternal Grandfather         cause of death     Social History     Tobacco Use   • Smoking status: Never Smoker   • Smokeless tobacco: Never Used   Substance Use Topics   • Alcohol use: Yes     Comment: rare  "      Health Maintenance Due   Topic Date Due   • ANNUAL PHYSICAL  Never done   • COVID-19 Vaccine (1) Never done        Immunization History   Administered Date(s) Administered   • Tdap 05/03/2017       Allergies   Allergen Reactions   • Latex Rash   • Sertraline Hcl Other (See Comments)     Erectile dysfunction        Medications:  Current Outpatient Medications on File Prior to Visit   Medication Sig   • [DISCONTINUED] buPROPion XL (WELLBUTRIN XL) 150 MG 24 hr tablet Take 1 tablet by mouth Daily.   • [DISCONTINUED] sertraline (ZOLOFT) 50 MG tablet TAKE ONE TABLET BY MOUTH EVERY DAY     No current facility-administered medications on file prior to visit.       Vital Signs:   /77   Pulse 94   Temp 98.1 °F (36.7 °C)   Ht 175.3 cm (69\")   Wt 101 kg (223 lb)   SpO2 96%   BMI 32.93 kg/m²       Physical Exam:  Physical Exam  Vitals reviewed.   Constitutional:       General: He is not in acute distress.     Appearance: He is not ill-appearing.   Pulmonary:      Effort: Pulmonary effort is normal.   Genitourinary:     Comments: There is a lesion on the under surface of the penis kind of where the penis and scrotum come together.  It is roughly the size of a BB.  It is flesh-colored.  There is a very small central excoriation noted.  Neurological:      Mental Status: He is alert.         Result Review      The following data was reviewed by Raffi Pierson MD on 08/20/2021.  Lab Results   Component Value Date    WBC 3.85 (L) 11/17/2020    HGB 16.30 11/17/2020    HCT 47.6 11/17/2020    MCV 88.5 11/17/2020    .00 11/17/2020     Lab Results   Component Value Date    BUN 24 11/17/2020    CREATININE 1.19 11/17/2020    BCR 20 11/17/2020    K 4.3 11/17/2020    CO2 21 (L) 11/17/2020    CALCIUM 8.9 11/17/2020    ALBUMIN 4.2 11/17/2020    LABIL2 1.7 11/17/2020    AST 32 11/17/2020    ALT 37 11/17/2020     Lab Results   Component Value Date    CHLPL 130 11/17/2020    TRIG 81 11/17/2020    HDL 47 11/17/2020 "    LDL 67 (L) 11/17/2020     Lab Results   Component Value Date    TSH 1.760 11/17/2020     No results found for: HGBA1C  Lab Results   Component Value Date    PSA 0.74 11/17/2020            Assessment and Plan:   Today, we have again reviewed his care.  He is concerned about the finding on the penis.  I am going to recommend referral to urology since it has recurred.  It is probably a wart related finding, but I want to be cautious.  Beyond this, we will go ahead and repeat some blood work as noted below given the recent changes.  Additionally, we discussed the anxiety issue and no change in Wellbutrin will be made presently.  He has had some struggle with sexual function since being on that medicine though.  It is a little better for him than the previous medicine was though.  We will go ahead and add sildenafil for as needed use.  Potential risks are discussed.       Diagnoses and all orders for this visit:    1. Penile lesion (Primary)  -     Ambulatory Referral to Urology    2. High risk heterosexual behavior  -     HIV-1/O/2 Ag/Ab w Reflex; Future  -     RPR; Future  -     Hepatitis C antibody; Future  -     Hepatitis B Core Antibody, IgM; Future  -     Hepatitis B surface antigen; Future    3. Other specified anxiety disorders  -     buPROPion XL (WELLBUTRIN XL) 150 MG 24 hr tablet; Take 1 tablet by mouth Daily.  Dispense: 90 tablet; Refill: 1    4. Erectile dysfunction, unspecified erectile dysfunction type  -     sildenafil (Viagra) 100 MG tablet; Take 1/2 to 1 tablet an hour prior to sex  Dispense: 30 tablet; Refill: 2

## 2021-08-21 LAB — RPR SER QL: NORMAL

## 2021-09-09 DIAGNOSIS — F41.8 OTHER SPECIFIED ANXIETY DISORDERS: ICD-10-CM

## 2021-09-09 RX ORDER — TRAZODONE HYDROCHLORIDE 50 MG/1
50 TABLET ORAL NIGHTLY PRN
Qty: 30 TABLET | Refills: 1 | Status: SHIPPED | OUTPATIENT
Start: 2021-09-09 | End: 2022-05-02 | Stop reason: SDUPTHER

## 2021-12-05 PROBLEM — N48.9 PENILE LESION: Status: ACTIVE | Noted: 2021-12-05

## 2021-12-06 ENCOUNTER — OFFICE VISIT (OUTPATIENT)
Dept: UROLOGY | Facility: CLINIC | Age: 41
End: 2021-12-06

## 2021-12-06 VITALS — BODY MASS INDEX: 34.16 KG/M2 | WEIGHT: 230.6 LBS | HEIGHT: 69 IN

## 2021-12-06 DIAGNOSIS — N48.9 PENILE LESION: Primary | ICD-10-CM

## 2021-12-06 PROCEDURE — 99203 OFFICE O/P NEW LOW 30 MIN: CPT | Performed by: UROLOGY

## 2021-12-06 NOTE — PROGRESS NOTES
Chief Complaint: Urologic complaint    Subjective         History of Present Illness  Latrell Morales is a 41 y.o. male presents to [facility] to be seen for penile lesion    Patient has had a couple of lesions come and go on the penis he currently has 1 on the left shaft.  Really no pain.  To have a plummer-like pimple at one point.  These do usually go away.  He is had this on and off the last couple years.    No associated rash or vesicular rash.  I did show him pictures of herpes daily did not look like that.    Patient has had some infidelity in his marriage he has been tested by his PCP for STI.  No risk at this time    No cardiopulmonary history.    8/21 RPR-negative, trichomonas - negative, HIV-negative, herpes-negative    Objective     Past Medical History:   Diagnosis Date   • Abnormal electrocardiogram    • Abnormal level of hormones in specimens from other organs, systems and tissues    • High risk sexual behavior    • Insomnia    • Nontoxic goiter    • Other specified anxiety disorders        Past Surgical History:   Procedure Laterality Date   • KNEE SURGERY Left     xfive- titanium plate and screws   • VASECTOMY           Current Outpatient Medications:   •  buPROPion XL (WELLBUTRIN XL) 150 MG 24 hr tablet, Take 1 tablet by mouth Daily., Disp: 90 tablet, Rfl: 1  •  sildenafil (Viagra) 100 MG tablet, Take 1/2 to 1 tablet an hour prior to sex, Disp: 30 tablet, Rfl: 2  •  traZODone (DESYREL) 50 MG tablet, Take 1 tablet by mouth At Night As Needed for Sleep., Disp: 30 tablet, Rfl: 1    Allergies   Allergen Reactions   • Latex Rash   • Sertraline Hcl Other (See Comments)     Erectile dysfunction        Family History   Problem Relation Age of Onset   • No Known Problems Mother    • Suicidality Father         cause of death   • No Known Problems Sister    • Coronary artery disease Maternal Grandmother    • Heart failure Maternal Grandfather         cause of death   • Heart attack  "Paternal Grandfather         cause of death       Social History     Socioeconomic History   • Marital status:    • Number of children: 2   Tobacco Use   • Smoking status: Never Smoker   • Smokeless tobacco: Never Used   Vaping Use   • Vaping Use: Never used   Substance and Sexual Activity   • Alcohol use: Yes     Comment: rare   • Drug use: Never   • Sexual activity: Yes       Vital Signs:   Ht 175.3 cm (69\")   Wt 105 kg (230 lb 9.6 oz)   BMI 34.05 kg/m²      Physical exam    Alert and orient x3  Well appearing, well developed, in no acute distress   Unlabored respirations  Nontender/nondistended    Normal circumcised phallus.  On the left shaft patient does have a raised erythematous area about 3 mm.  No vesicles or rash surrounding this.    Grossly oriented to person, place and time, judgment is intact, normal mood and affect    Results for orders placed or performed in visit on 08/20/21   HIV-1/O/2 Ag/Ab w Reflex    Specimen: Blood   Result Value Ref Range    HIV-1/ HIV-2 Non-Reactive Non-Reactive   RPR    Specimen: Blood   Result Value Ref Range    RPR Non-Reactive Non-Reactive   Hepatitis C antibody    Specimen: Blood   Result Value Ref Range    Hepatitis C Ab Non-Reactive Non-Reactive   Hepatitis B Core Antibody, IgM    Specimen: Blood   Result Value Ref Range    Hep B C IgM Non-Reactive Non-Reactive   Hepatitis B surface antigen    Specimen: Blood   Result Value Ref Range    Hepatitis B Surface Ag Non-Reactive Non-Reactive             Assessment and Plan    Diagnoses and all orders for this visit:    1. Penile lesion (Primary)      Discussed with the patient I think he has a infected hair follicle/folliculitis.  I did see any signs/symptoms of STI/pathology at this time.  I did discuss with patientto keep monitoring if does not go away or get worse.  If that happens I would want to reexamine patient and he voiced understanding    Follow-up as needed  "

## 2022-03-10 DIAGNOSIS — F41.8 OTHER SPECIFIED ANXIETY DISORDERS: ICD-10-CM

## 2022-03-10 RX ORDER — BUPROPION HYDROCHLORIDE 150 MG/1
TABLET ORAL
Qty: 90 TABLET | Refills: 1 | Status: SHIPPED | OUTPATIENT
Start: 2022-03-10 | End: 2022-03-16

## 2022-03-16 ENCOUNTER — TELEPHONE (OUTPATIENT)
Dept: FAMILY MEDICINE CLINIC | Age: 42
End: 2022-03-16

## 2022-03-16 DIAGNOSIS — F41.8 OTHER SPECIFIED ANXIETY DISORDERS: Primary | ICD-10-CM

## 2022-03-16 RX ORDER — VENLAFAXINE HYDROCHLORIDE 75 MG/1
75 CAPSULE, EXTENDED RELEASE ORAL DAILY
Qty: 30 CAPSULE | Refills: 0 | Status: SHIPPED | OUTPATIENT
Start: 2022-03-16 | End: 2022-04-11 | Stop reason: SDUPTHER

## 2022-03-16 NOTE — TELEPHONE ENCOUNTER
Pt states the wellbutrin is not working for him. He would like to know if you can change him to Effexor. Please advise.

## 2022-03-17 NOTE — TELEPHONE ENCOUNTER
Please confirm that he is not having suicidal thoughts.  I have sent a prescription for 30 days of Effexor XR to replace Wellbutrin XL.  Schedule him for a follow-up appointment in that timeframe to review.  Thanks.

## 2022-04-11 DIAGNOSIS — F41.8 OTHER SPECIFIED ANXIETY DISORDERS: ICD-10-CM

## 2022-04-11 RX ORDER — VENLAFAXINE HYDROCHLORIDE 75 MG/1
75 CAPSULE, EXTENDED RELEASE ORAL DAILY
Qty: 30 CAPSULE | Refills: 0 | Status: SHIPPED | OUTPATIENT
Start: 2022-04-11 | End: 2022-05-02 | Stop reason: SDUPTHER

## 2022-04-11 NOTE — TELEPHONE ENCOUNTER
Caller: Latrell Morales    Relationship: Self    Best call back number: [phone]     Requested Prescriptions:   Requested Prescriptions     Pending Prescriptions Disp Refills   • venlafaxine XR (Effexor XR) 75 MG 24 hr capsule 30 capsule 0     Sig: Take 1 capsule by mouth Daily.        Pharmacy where request should be sent: [facility] [address] [facility] [address] [facility] [phone] [facility] [phone] FX     Additional details provided by patient:  5 DAYS LEFT     Does the patient have less than a 3 day supply:  [] Yes  [x] No    Maite Galan Rep   04/11/22 11:49 EDT

## 2022-05-02 ENCOUNTER — OFFICE VISIT (OUTPATIENT)
Dept: FAMILY MEDICINE CLINIC | Age: 42
End: 2022-05-02

## 2022-05-02 ENCOUNTER — LAB (OUTPATIENT)
Dept: LAB | Facility: HOSPITAL | Age: 42
End: 2022-05-02

## 2022-05-02 VITALS
BODY MASS INDEX: 33.92 KG/M2 | HEART RATE: 91 BPM | HEIGHT: 69 IN | TEMPERATURE: 98.1 F | DIASTOLIC BLOOD PRESSURE: 76 MMHG | SYSTOLIC BLOOD PRESSURE: 113 MMHG | WEIGHT: 229 LBS

## 2022-05-02 DIAGNOSIS — R53.83 FATIGUE, UNSPECIFIED TYPE: ICD-10-CM

## 2022-05-02 DIAGNOSIS — Z12.5 SCREENING FOR PROSTATE CANCER: ICD-10-CM

## 2022-05-02 DIAGNOSIS — Z00.00 PHYSICAL EXAM: Primary | ICD-10-CM

## 2022-05-02 DIAGNOSIS — N52.9 ERECTILE DYSFUNCTION, UNSPECIFIED ERECTILE DYSFUNCTION TYPE: ICD-10-CM

## 2022-05-02 DIAGNOSIS — I51.7 LEFT VENTRICULAR HYPERTROPHY: ICD-10-CM

## 2022-05-02 DIAGNOSIS — F41.1 GENERALIZED ANXIETY DISORDER: ICD-10-CM

## 2022-05-02 LAB
ALBUMIN SERPL-MCNC: 4 G/DL (ref 3.5–5.2)
ALBUMIN/GLOB SERPL: 1.8 G/DL
ALP SERPL-CCNC: 40 U/L (ref 39–117)
ALT SERPL W P-5'-P-CCNC: 33 U/L (ref 1–41)
ANION GAP SERPL CALCULATED.3IONS-SCNC: 9.5 MMOL/L (ref 5–15)
AST SERPL-CCNC: 27 U/L (ref 1–40)
BILIRUB SERPL-MCNC: 0.4 MG/DL (ref 0–1.2)
BUN SERPL-MCNC: 18 MG/DL (ref 6–20)
BUN/CREAT SERPL: 13.6 (ref 7–25)
CALCIUM SPEC-SCNC: 8.9 MG/DL (ref 8.6–10.5)
CHLORIDE SERPL-SCNC: 106 MMOL/L (ref 98–107)
CHOLEST SERPL-MCNC: 108 MG/DL (ref 0–200)
CO2 SERPL-SCNC: 25.5 MMOL/L (ref 22–29)
CREAT SERPL-MCNC: 1.32 MG/DL (ref 0.76–1.27)
DEPRECATED RDW RBC AUTO: 45 FL (ref 37–54)
EGFRCR SERPLBLD CKD-EPI 2021: 69.5 ML/MIN/1.73
ERYTHROCYTE [DISTWIDTH] IN BLOOD BY AUTOMATED COUNT: 13.2 % (ref 12.3–15.4)
GLOBULIN UR ELPH-MCNC: 2.2 GM/DL
GLUCOSE SERPL-MCNC: 66 MG/DL (ref 65–99)
HCT VFR BLD AUTO: 48.7 % (ref 37.5–51)
HDLC SERPL-MCNC: 39 MG/DL (ref 40–60)
HGB BLD-MCNC: 16.2 G/DL (ref 13–17.7)
LDLC SERPL CALC-MCNC: 55 MG/DL (ref 0–100)
LDLC/HDLC SERPL: 1.45 {RATIO}
MCH RBC QN AUTO: 30.8 PG (ref 26.6–33)
MCHC RBC AUTO-ENTMCNC: 33.3 G/DL (ref 31.5–35.7)
MCV RBC AUTO: 92.6 FL (ref 79–97)
PLATELET # BLD AUTO: 200 10*3/MM3 (ref 140–450)
PMV BLD AUTO: 9.5 FL (ref 6–12)
POTASSIUM SERPL-SCNC: 4.7 MMOL/L (ref 3.5–5.2)
PROT SERPL-MCNC: 6.2 G/DL (ref 6–8.5)
PSA SERPL-MCNC: 0.88 NG/ML (ref 0–4)
RBC # BLD AUTO: 5.26 10*6/MM3 (ref 4.14–5.8)
SODIUM SERPL-SCNC: 141 MMOL/L (ref 136–145)
TESTOST SERPL-MCNC: >1500 NG/DL (ref 249–836)
TRIGL SERPL-MCNC: 62 MG/DL (ref 0–150)
TSH SERPL DL<=0.05 MIU/L-ACNC: 2.75 UIU/ML (ref 0.27–4.2)
VLDLC SERPL-MCNC: 14 MG/DL (ref 5–40)
WBC NRBC COR # BLD: 5.87 10*3/MM3 (ref 3.4–10.8)

## 2022-05-02 PROCEDURE — 85027 COMPLETE CBC AUTOMATED: CPT

## 2022-05-02 PROCEDURE — 99396 PREV VISIT EST AGE 40-64: CPT | Performed by: FAMILY MEDICINE

## 2022-05-02 PROCEDURE — 36415 COLL VENOUS BLD VENIPUNCTURE: CPT

## 2022-05-02 PROCEDURE — G0103 PSA SCREENING: HCPCS

## 2022-05-02 PROCEDURE — 84443 ASSAY THYROID STIM HORMONE: CPT

## 2022-05-02 PROCEDURE — 80061 LIPID PANEL: CPT

## 2022-05-02 PROCEDURE — 80053 COMPREHEN METABOLIC PANEL: CPT

## 2022-05-02 PROCEDURE — 84403 ASSAY OF TOTAL TESTOSTERONE: CPT

## 2022-05-02 RX ORDER — TRAZODONE HYDROCHLORIDE 50 MG/1
50 TABLET ORAL NIGHTLY PRN
Qty: 30 TABLET | Refills: 1 | Status: SHIPPED | OUTPATIENT
Start: 2022-05-02 | End: 2023-02-23

## 2022-05-02 RX ORDER — VENLAFAXINE HYDROCHLORIDE 75 MG/1
75 CAPSULE, EXTENDED RELEASE ORAL DAILY
Qty: 90 CAPSULE | Refills: 1 | Status: SHIPPED | OUTPATIENT
Start: 2022-05-02 | End: 2023-02-23

## 2022-05-02 RX ORDER — IBUPROFEN 800 MG/1
800 TABLET ORAL EVERY 8 HOURS PRN
Qty: 100 TABLET | Refills: 1 | Status: SHIPPED | OUTPATIENT
Start: 2022-05-02 | End: 2022-07-11

## 2022-05-02 RX ORDER — SILDENAFIL 100 MG/1
TABLET, FILM COATED ORAL
Qty: 30 TABLET | Refills: 2 | Status: SHIPPED | OUTPATIENT
Start: 2022-05-02 | End: 2022-11-16 | Stop reason: SDUPTHER

## 2022-05-02 NOTE — PROGRESS NOTES
Latrell Morales presents to [facility].    Chief Complaint:  Wellness exam, anxiety    Subjective       History of Present Illness:  Latrell is in today for wellness exam.  He is 41 years old and works for [employer] where he has been for almost 23 years.  He does not smoke cigarettes.  He drinks socially.  How much he drinks will vary somewhat.  He denies any problem drinking.  He did go through divorce about a year ago.  He states he is trying to get life back to normal to some degree.  There has been part of the last year where he did not take such good care of himself with regard to diet.  He does remain on some anxiety medication, Effexor XR.  He would like to stay on that medication at least for the time being.  He does have some trazodone that he has been prescribed to help with sleep.  This has been an ongoing issue for him.      Review of Systems:  Review of Systems   Constitutional: Negative for chills and fever.   Respiratory: Negative for cough and shortness of breath.    Cardiovascular: Negative for chest pain and palpitations.   Gastrointestinal: Negative for abdominal pain, nausea and vomiting.        Objective   Medical History:  Past Medical History:   • Abnormal electrocardiogram   • Abnormal level of hormones in specimens from other organs, systems and tissues   • High risk sexual behavior   • Insomnia   • Nontoxic goiter   • Other specified anxiety disorders     Past Surgical History:   • KNEE SURGERY    xfive- titanium plate and screws   • VASECTOMY      Family History   Problem Relation Age of Onset   • No Known Problems Mother    • Suicidality Father         cause of death   • No Known Problems Sister    • Coronary artery disease Maternal Grandmother    • Heart failure Maternal Grandfather         cause of death   • Heart attack Paternal Grandfather         cause of death     Social History     Tobacco Use   • Smoking status: Never Smoker   • Smokeless tobacco:  "Never Used   Substance Use Topics   • Alcohol use: Yes     Comment: rare       There are no preventive care reminders to display for this patient.     Immunization History   Administered Date(s) Administered   • Tdap 05/03/2017       Allergies   Allergen Reactions   • Adhesive Tape Hives and Rash   • Latex Rash   • Sertraline Hcl Other (See Comments)     Erectile dysfunction        Medications:  Current Outpatient Medications on File Prior to Visit   Medication Sig   • [DISCONTINUED] sildenafil (Viagra) 100 MG tablet Take 1/2 to 1 tablet an hour prior to sex   • [DISCONTINUED] traZODone (DESYREL) 50 MG tablet Take 1 tablet by mouth At Night As Needed for Sleep.   • [DISCONTINUED] venlafaxine XR (Effexor XR) 75 MG 24 hr capsule Take 1 capsule by mouth Daily.   • [DISCONTINUED] buPROPion XL (WELLBUTRIN XL) 150 MG 24 hr tablet TAKE ONE TABLET BY MOUTH EVERY DAY     No current facility-administered medications on file prior to visit.       Vital Signs:   /76 (BP Location: Right arm, Patient Position: Sitting)   Pulse 91   Temp 98.1 °F (36.7 °C) (Oral)   Ht 175.3 cm (69\")   Wt 104 kg (229 lb)   BMI 33.82 kg/m²       Physical Exam:  Physical Exam  Vitals and nursing note reviewed.   Constitutional:       General: He is not in acute distress.     Appearance: He is not ill-appearing.   HENT:      Right Ear: Tympanic membrane and ear canal normal.      Left Ear: Tympanic membrane and ear canal normal.      Mouth/Throat:      Mouth: Mucous membranes are moist.      Comments: Pharynx appears normal  Eyes:      Extraocular Movements: Extraocular movements intact.      Pupils: Pupils are equal, round, and reactive to light.   Neck:      Thyroid: No thyromegaly.   Cardiovascular:      Rate and Rhythm: Normal rate and regular rhythm.      Heart sounds: No murmur heard.  Pulmonary:      Effort: Pulmonary effort is normal.      Breath sounds: Normal breath sounds.   Abdominal:      General: There is no distension.      " Palpations: Abdomen is soft. There is no mass.      Tenderness: There is no abdominal tenderness.   Musculoskeletal:      Cervical back: Normal range of motion.   Skin:     Findings: No lesion or rash.   Neurological:      General: No focal deficit present.      Mental Status: He is oriented to person, place, and time.      Cranial Nerves: No cranial nerve deficit.   Psychiatric:         Mood and Affect: Mood normal.         Result Review      The following data was reviewed by Raffi Pierson MD on 05/02/2022.  Lab Results   Component Value Date    WBC 3.85 (L) 11/17/2020    HGB 16.30 11/17/2020    HCT 47.6 11/17/2020    MCV 88.5 11/17/2020    .00 11/17/2020     Lab Results   Component Value Date    GLUCOSE 87 11/17/2020    BUN 24 11/17/2020    CREATININE 1.19 11/17/2020     11/17/2020    K 4.3 11/17/2020     11/17/2020    CO2 21 (L) 11/17/2020    CALCIUM 8.9 11/17/2020    PROTEINTOT 6.7 11/17/2020    ALBUMIN 4.2 11/17/2020    ALT 37 11/17/2020    AST 32 11/17/2020    ALKPHOS 55 11/17/2020    BILITOT 0.38 11/17/2020    GLOB 2.5 11/17/2020    BCR 20 11/17/2020    ANIONGAP 17 11/17/2020      Lab Results   Component Value Date    CHLPL 130 11/17/2020    TRIG 81 11/17/2020    HDL 47 11/17/2020    LDL 67 (L) 11/17/2020     Lab Results   Component Value Date    TSH 1.760 11/17/2020     No results found for: HGBA1C  Lab Results   Component Value Date    PSA 0.74 11/17/2020     Lab Results   Component Value Date    TESTOSTERONE 995 (H) 11/17/2020              Assessment and Plan:   Today, we have reviewed Latrell's care.  He is doing well overall.  His blood pressure is well controlled.  We will refill needed medications for him as noted below.  He will stay on the Effexor XR for at least another 6 months.  He was noted on previous echocardiogram to have some degree of LVH.  We will arrange repeat echocardiogram for him at this time.  He also continues to use testosterone.  We will check his level.   He thinks he will probably be somewhat elevated.  We briefly discussed potential risks of use of that medication.  We will see what his labs show and then be back in touch with him.       Diagnoses and all orders for this visit:    1. Physical exam (Primary)    2. Left ventricular hypertrophy  -     Adult Transthoracic Echo Complete W/ Cont if Necessary Per Protocol; Future    3. Generalized anxiety disorder  -     traZODone (DESYREL) 50 MG tablet; Take 1 tablet by mouth At Night As Needed for Sleep.  Dispense: 30 tablet; Refill: 1  -     venlafaxine XR (Effexor XR) 75 MG 24 hr capsule; Take 1 capsule by mouth Daily.  Dispense: 90 capsule; Refill: 1    4. Fatigue, unspecified type  -     CBC (No Diff); Future  -     Comprehensive Metabolic Panel; Future  -     Lipid Panel; Future  -     TSH; Future  -     Testosterone; Future    5. Screening for prostate cancer  -     PSA Screen; Future    6. Erectile dysfunction, unspecified erectile dysfunction type  -     sildenafil (Viagra) 100 MG tablet; Take 1/2 to 1 tablet an hour prior to sex  Dispense: 30 tablet; Refill: 2    Other orders  -     ibuprofen (ADVIL,MOTRIN) 800 MG tablet; Take 1 tablet by mouth Every 8 (Eight) Hours As Needed for Mild Pain  or Moderate Pain .  Dispense: 100 tablet; Refill: 1          Follow Up   Return in about 1 year (around 5/2/2023) for Recheck.  Patient was given instructions and counseling regarding his condition or for health maintenance advice. Please see specific information pulled into the AVS if appropriate.

## 2022-06-13 ENCOUNTER — HOSPITAL ENCOUNTER (OUTPATIENT)
Dept: CARDIOLOGY | Facility: HOSPITAL | Age: 42
Discharge: HOME | End: 2022-06-13
Admitting: FAMILY MEDICINE

## 2022-06-13 DIAGNOSIS — I51.7 LEFT VENTRICULAR HYPERTROPHY: ICD-10-CM

## 2022-06-13 LAB
BH CV ECHO MEAS - AO ROOT DIAM: 2.6 CM
BH CV ECHO MEAS - EF(MOD-BP): 48.1 %
BH CV ECHO MEAS - IVSD: 1.5 CM
BH CV ECHO MEAS - LA DIMENSION: 3.9 CM
BH CV ECHO MEAS - LAT PEAK E' VEL: 11.2 CM/SEC
BH CV ECHO MEAS - LVIDD: 6 CM
BH CV ECHO MEAS - LVIDS: 4.5 CM
BH CV ECHO MEAS - LVOT DIAM: 2.5 CM
BH CV ECHO MEAS - LVPWD: 1.4 CM
BH CV ECHO MEAS - MED PEAK E' VEL: 7.51 CM/SEC
BH CV ECHO MEAS - MR MAX PG: 51 MMHG
BH CV ECHO MEAS - MR MAX VEL: 356 CM/SEC
BH CV ECHO MEAS - MR MEAN PG: 36 MMHG
BH CV ECHO MEAS - MR MEAN VEL: 290 CM/SEC
BH CV ECHO MEAS - MR VTI: 119 CM
BH CV ECHO MEAS - MV A MAX VEL: 71.4 CM/SEC
BH CV ECHO MEAS - MV DEC TIME: 296 MSEC
BH CV ECHO MEAS - MV E MAX VEL: 62.4 CM/SEC
BH CV ECHO MEAS - MV E/A: 0.9
BH CV ECHO MEAS - TR MAX PG: 17 MMHG
BH CV ECHO MEAS - TR MAX VEL: 207 CM/SEC
BH CV ECHO MEASUREMENTS AVERAGE E/E' RATIO: 6.67
IVRT: 81 MSEC
LEFT ATRIUM VOLUME INDEX: 28.5 ML/M2
MAXIMAL PREDICTED HEART RATE: 179 BPM
STRESS TARGET HR: 152 BPM

## 2022-06-13 PROCEDURE — 93306 TTE W/DOPPLER COMPLETE: CPT

## 2022-06-14 DIAGNOSIS — I50.20 SYSTOLIC CONGESTIVE HEART FAILURE, UNSPECIFIED HF CHRONICITY: Primary | ICD-10-CM

## 2022-06-28 ENCOUNTER — OFFICE VISIT (OUTPATIENT)
Dept: CARDIOLOGY | Facility: CLINIC | Age: 42
End: 2022-06-28

## 2022-06-28 VITALS
HEART RATE: 81 BPM | HEIGHT: 69 IN | DIASTOLIC BLOOD PRESSURE: 83 MMHG | SYSTOLIC BLOOD PRESSURE: 132 MMHG | WEIGHT: 227 LBS | BODY MASS INDEX: 33.62 KG/M2

## 2022-06-28 DIAGNOSIS — I51.9 LV DYSFUNCTION: Primary | ICD-10-CM

## 2022-06-28 PROCEDURE — 99204 OFFICE O/P NEW MOD 45 MIN: CPT | Performed by: INTERNAL MEDICINE

## 2022-06-28 PROCEDURE — 93000 ELECTROCARDIOGRAM COMPLETE: CPT | Performed by: INTERNAL MEDICINE

## 2022-06-28 RX ORDER — CARVEDILOL 3.12 MG/1
3.12 TABLET ORAL 2 TIMES DAILY
Qty: 180 TABLET | Refills: 3 | Status: SHIPPED | OUTPATIENT
Start: 2022-06-28 | End: 2023-02-23

## 2022-06-28 NOTE — PROGRESS NOTES
Chief Complaint  Systolic Heart Failure    Subjective            Latrell Morales presents to [facility] CARDIOLOGY  History of Present Illness    41-year-old male.  He has no previous cardiac problems in the past.  He had an echocardiogram in 2019 which showed left ventricular hypertrophy.  He followed up with his PCP recently and a repeat echo was done which showed ejection fraction calculated 48%, with moderate LVH and increased left ventricular dimensions compared to his previous study.  The patient is essentially asymptomatic.  He denies chest pain, palpitations or excessive shortness of breath.  He stays active, he exercises regularly.  He does not drink excessively.  He is a non-smoker.  He has used anabolic agents for bodybuilding over the last 20 years.  He is on a relatively low-dose of testosterone currently.  He reports a lot of life stressors, and poor sleep hygiene over the past year.    [facility]  Past Medical History:   Diagnosis Date   • Abnormal electrocardiogram    • Abnormal level of hormones in specimens from other organs, systems and tissues    • High risk sexual behavior    • Insomnia    • Nontoxic goiter    • Other specified anxiety disorders          SURGICALHX  Past Surgical History:   Procedure Laterality Date   • KNEE SURGERY Left     xfive- titanium plate and screws   • VASECTOMY          SOC  Social History     Socioeconomic History   • Marital status:    • Number of children: 2   Tobacco Use   • Smoking status: Never Smoker   • Smokeless tobacco: Never Used   Vaping Use   • Vaping Use: Never used   Substance and Sexual Activity   • Alcohol use: Yes     Comment: rare   • Drug use: Never   • Sexual activity: Yes         FAMX  Family History   Problem Relation Age of Onset   • No Known Problems Mother    • Suicidality Father         cause of death   • No Known Problems Sister    • Coronary artery disease Maternal Grandmother    • Heart failure Maternal Grandfather          "cause of death   • Heart attack Paternal Grandfather         cause of death          ALLERGY  Allergies   Allergen Reactions   • Adhesive Tape Hives and Rash   • Latex Rash   • Sertraline Hcl Other (See Comments)     Erectile dysfunction        MEDSCURRENT    Current Outpatient Medications:   •  ibuprofen (ADVIL,MOTRIN) 800 MG tablet, Take 1 tablet by mouth Every 8 (Eight) Hours As Needed for Mild Pain  or Moderate Pain ., Disp: 100 tablet, Rfl: 1  •  sildenafil (Viagra) 100 MG tablet, Take 1/2 to 1 tablet an hour prior to sex, Disp: 30 tablet, Rfl: 2  •  traZODone (DESYREL) 50 MG tablet, Take 1 tablet by mouth At Night As Needed for Sleep., Disp: 30 tablet, Rfl: 1  •  venlafaxine XR (Effexor XR) 75 MG 24 hr capsule, Take 1 capsule by mouth Daily., Disp: 90 capsule, Rfl: 1  •  carvedilol (COREG) 3.125 MG tablet, Take 1 tablet by mouth 2 (Two) Times a Day., Disp: 180 tablet, Rfl: 3      Review of Systems   Constitutional: Negative.   HENT: Negative.    Eyes: Negative.    Cardiovascular: Negative for chest pain and dyspnea on exertion.   Respiratory: Negative.  Negative for shortness of breath.    Endocrine: Negative.    Hematologic/Lymphatic: Negative.    Skin: Negative.    Musculoskeletal: Negative.    Gastrointestinal: Negative.    Genitourinary: Negative.    Neurological: Negative.    Psychiatric/Behavioral: Negative.         Objective     /83   Pulse 81   Ht 175.3 cm (69\")   Wt 103 kg (227 lb)   BMI 33.52 kg/m²       General Appearance:   · well developed  · well nourished  HENT:   · oropharynx moist  · lips not cyanotic  Neck:  · thyroid not enlarged  · supple  Respiratory:  · no respiratory distress  · normal breath sounds  · no rales  Cardiovascular:  · no jugular venous distention  · regular rhythm  · apical impulse normal  · S1 normal, S2 normal  · no S3, no S4   · no murmur  · no rub, no thrill  · carotid pulses normal; no bruit  · pedal pulses normal  · lower extremity edema: none  "   Musculoskeletal:  · no clubbing of fingers.   · normocephalic, head atraumatic  Skin:   · warm, dry  Psychiatric:  · judgement and insight appropriate  · normal mood and affect      Result Review :     The following data was reviewed by: Kyrie Rose MD on 06/28/2022:    CMP    CMP 5/2/22   Glucose 66   BUN 18   Creatinine 1.32 (A)   Sodium 141   Potassium 4.7   Chloride 106   Calcium 8.9   Albumin 4.00   Total Bilirubin 0.4   Alkaline Phosphatase 40   AST (SGOT) 27   ALT (SGPT) 33   (A) Abnormal value            CBC    CBC 5/2/22   WBC 5.87   RBC 5.26   Hemoglobin 16.2   Hematocrit 48.7   MCV 92.6   MCH 30.8   MCHC 33.3   RDW 13.2   Platelets 200           Lipid Panel    Lipid Panel 5/2/22   Total Cholesterol 108   Triglycerides 62   HDL Cholesterol 39 (A)   VLDL Cholesterol 14   LDL Cholesterol  55   LDL/HDL Ratio 1.45   (A) Abnormal value            TSH    TSH 5/2/22   TSH 2.750             Data reviewed: Echo reviewed       ECG 12 Lead    Date/Time: 6/28/2022 12:59 PM  Performed by: JERALD Rose MD  Authorized by: JERALD Rose MD   Previous ECG: no previous ECG available  Rhythm: sinus rhythm  Conduction: conduction normal  ST Segments: ST segments normal  T Waves: T waves normal  QRS axis: normal  Other: no other findings    Clinical impression: normal ECG                   Assessment and Plan        ASSESSMENT:  Encounter Diagnosis   Name Primary?   • LV dysfunction Yes         PLAN:    1.  Mr. Morales had a recent echo which showed mild left ventricular dysfunction.  He is asymptomatic.  He has moderate concentric LVH.  He does have a family history of cardiomyopathy it sounds like from his description.  He has longstanding anabolic steroid use as well and mild hypertension.  These could also contribute to the development of cardiomyopathy.  I discussed with him today we need to rule out ischemia and therefore stress imaging will be scheduled.  In terms of medical therapy with mild LV  dysfunction I recommend a low-dose beta-blocker.  He is hesitant to take any medications but after discussing today he is agreeable to try a low-dose of beta-blocker.  In addition I told him I recommend cessation of anabolic agents given that they potentially can cause cardiomyopathy and at this point we need to try to eliminate any possible causes.  He seems understanding of this but seems unlikely to stop this agent.  2.  We will discuss stress imaging when available and follow-up to be determined            Patient was given instructions and counseling regarding his condition or for health maintenance advice. Please see specific information pulled into the AVS if appropriate.             JERALD Rose MD  6/28/2022    12:56 EDT

## 2022-07-11 RX ORDER — IBUPROFEN 800 MG/1
TABLET ORAL
Qty: 100 TABLET | Refills: 1 | Status: SHIPPED | OUTPATIENT
Start: 2022-07-11 | End: 2023-03-02

## 2022-08-08 ENCOUNTER — HOSPITAL ENCOUNTER (OUTPATIENT)
Dept: NUCLEAR MEDICINE | Facility: HOSPITAL | Age: 42
Discharge: HOME | End: 2022-08-08

## 2022-08-08 DIAGNOSIS — I51.9 LV DYSFUNCTION: ICD-10-CM

## 2022-08-08 PROCEDURE — 93017 CV STRESS TEST TRACING ONLY: CPT

## 2022-08-08 PROCEDURE — 0 TECHNETIUM TETROFOSMIN KIT: Performed by: INTERNAL MEDICINE

## 2022-08-08 PROCEDURE — 93018 CV STRESS TEST I&R ONLY: CPT | Performed by: INTERNAL MEDICINE

## 2022-08-08 PROCEDURE — A9502 TC99M TETROFOSMIN: HCPCS | Performed by: INTERNAL MEDICINE

## 2022-08-08 PROCEDURE — 78452 HT MUSCLE IMAGE SPECT MULT: CPT

## 2022-08-08 PROCEDURE — 78452 HT MUSCLE IMAGE SPECT MULT: CPT | Performed by: INTERNAL MEDICINE

## 2022-08-08 RX ADMIN — TETROFOSMIN 1 DOSE: 1.38 INJECTION, POWDER, LYOPHILIZED, FOR SOLUTION INTRAVENOUS at 10:52

## 2022-08-08 RX ADMIN — TETROFOSMIN 1 DOSE: 1.38 INJECTION, POWDER, LYOPHILIZED, FOR SOLUTION INTRAVENOUS at 09:02

## 2022-08-10 ENCOUNTER — TELEPHONE (OUTPATIENT)
Dept: CARDIOLOGY | Facility: CLINIC | Age: 42
End: 2022-08-10

## 2022-08-10 LAB
BH CV IMMEDIATE POST RECOVERY TECH DATA SYMPTOMS: NORMAL
BH CV IMMEDIATE POST TECH DATA BLOOD PRESSURE: NORMAL MMHG
BH CV IMMEDIATE POST TECH DATA HEART RATE: 141 BPM
BH CV IMMEDIATE POST TECH DATA OXYGEN SATS: 92 %
BH CV NINE MINUTE RECOVERY TECH DATA BLOOD PRESSURE: NORMAL MMHG
BH CV NINE MINUTE RECOVERY TECH DATA HEART RATE: 113 BPM
BH CV NINE MINUTE RECOVERY TECH DATA OXYGEN SATURATION: 96 %
BH CV NINE MINUTE RECOVERY TECH DATA SYMPTOMS: NORMAL
BH CV REST NUCLEAR ISOTOPE DOSE: 9.2 MCI
BH CV SIX MINUTE RECOVERY TECH DATA BLOOD PRESSURE: NORMAL
BH CV SIX MINUTE RECOVERY TECH DATA HEART RATE: 117 BPM
BH CV SIX MINUTE RECOVERY TECH DATA OXYGEN SATURATION: 96 %
BH CV STRESS BP STAGE 1: NORMAL
BH CV STRESS BP STAGE 2: NORMAL
BH CV STRESS BP STAGE 3: NORMAL
BH CV STRESS DURATION MIN STAGE 1: 3
BH CV STRESS DURATION MIN STAGE 2: 3
BH CV STRESS DURATION MIN STAGE 3: 3
BH CV STRESS DURATION SEC STAGE 1: 0
BH CV STRESS DURATION SEC STAGE 2: 0
BH CV STRESS DURATION SEC STAGE 3: 0
BH CV STRESS GRADE STAGE 1: 10
BH CV STRESS GRADE STAGE 2: 12
BH CV STRESS GRADE STAGE 3: 14
BH CV STRESS HR STAGE 1: 115
BH CV STRESS HR STAGE 2: 136
BH CV STRESS HR STAGE 3: 152
BH CV STRESS METS STAGE 1: 5
BH CV STRESS METS STAGE 2: 7.5
BH CV STRESS METS STAGE 3: 10
BH CV STRESS NUCLEAR ISOTOPE DOSE: 35.2 MCI
BH CV STRESS O2 STAGE 1: 96
BH CV STRESS O2 STAGE 2: 97
BH CV STRESS O2 STAGE 3: 92
BH CV STRESS PROTOCOL 1: NORMAL
BH CV STRESS RECOVERY BP: NORMAL MMHG
BH CV STRESS RECOVERY HR: 113 BPM
BH CV STRESS RECOVERY O2: 96 %
BH CV STRESS SPEED STAGE 1: 1.7
BH CV STRESS SPEED STAGE 2: 2.5
BH CV STRESS SPEED STAGE 3: 3.4
BH CV STRESS STAGE 1: 1
BH CV STRESS STAGE 2: 2
BH CV STRESS STAGE 3: 3
BH CV THREE MINUTE POST TECH DATA BLOOD PRESSURE: NORMAL MMHG
BH CV THREE MINUTE POST TECH DATA HEART RATE: 119 BPM
BH CV THREE MINUTE POST TECH DATA OXYGEN SATURATION: 97 %
LV EF NUC BP: 46 %
MAXIMAL PREDICTED HEART RATE: 179 BPM
PERCENT MAX PREDICTED HR: 84.92 %
STRESS BASELINE BP: NORMAL MMHG
STRESS BASELINE HR: 77 BPM
STRESS O2 SAT REST: 95 %
STRESS PERCENT HR: 100 %
STRESS POST ESTIMATED WORKLOAD: 10.2 METS
STRESS POST EXERCISE DUR MIN: 9 MIN
STRESS POST EXERCISE DUR SEC: 0 SEC
STRESS POST O2 SAT PEAK: 92 %
STRESS POST PEAK BP: NORMAL MMHG
STRESS POST PEAK HR: 152 BPM
STRESS TARGET HR: 152 BPM

## 2022-08-10 NOTE — TELEPHONE ENCOUNTER
----- Message from JERALD Rose MD sent at 8/10/2022  8:38 AM EDT -----  SPECT showed overall normal cardiac blood flow, there is no evidence of blocked arteries on the study.  His heart is somewhat enlarged and mildly weak, similar result as compared to the recent ultrasound he had.  I started him on a beta-blocker recently, and I would recommend continuing that.  As we recently discussed in the office I recommend that he stop using any anabolic agents based on the evidence that his heart is mildly weak.    I recommend we schedule a follow-up with Jessica in the [place] office in about 4 weeks to follow-up on blood pressure and medication management.

## 2022-11-16 DIAGNOSIS — N52.9 ERECTILE DYSFUNCTION, UNSPECIFIED ERECTILE DYSFUNCTION TYPE: ICD-10-CM

## 2022-11-16 RX ORDER — SILDENAFIL 100 MG/1
TABLET, FILM COATED ORAL
Qty: 30 TABLET | Refills: 2 | Status: SHIPPED | OUTPATIENT
Start: 2022-11-16

## 2022-11-16 NOTE — TELEPHONE ENCOUNTER
Caller: Latrell Morales    Relationship: Self    Best call back number: [phone]    Requested Prescriptions:   Requested Prescriptions     Pending Prescriptions Disp Refills   • sildenafil (Viagra) 100 MG tablet 30 tablet 2     Sig: Take 1/2 to 1 tablet an hour prior to sex        Pharmacy where request should be sent: [facility] [id number] - [facility] [place] - [address] [facility] - [phone]  - [phone] FX     Does the patient have less than a 3 day supply:  [x] Yes  [] No

## 2022-12-28 ENCOUNTER — TRANSCRIBE ORDERS (OUTPATIENT)
Dept: ADMINISTRATIVE | Facility: HOSPITAL | Age: 42
End: 2022-12-28
Payer: COMMERCIAL

## 2022-12-28 DIAGNOSIS — M25.511 RIGHT SHOULDER PAIN, UNSPECIFIED CHRONICITY: Primary | ICD-10-CM

## 2023-01-04 ENCOUNTER — HOSPITAL ENCOUNTER (OUTPATIENT)
Dept: GENERAL RADIOLOGY | Facility: HOSPITAL | Age: 43
Discharge: HOME | End: 2023-01-04
Admitting: PHYSICIAN ASSISTANT
Payer: COMMERCIAL

## 2023-01-04 ENCOUNTER — TRANSCRIBE ORDERS (OUTPATIENT)
Dept: ADMINISTRATIVE | Facility: HOSPITAL | Age: 43
End: 2023-01-04
Payer: COMMERCIAL

## 2023-01-04 DIAGNOSIS — M19.011 ARTHRITIS OF RIGHT SHOULDER REGION: ICD-10-CM

## 2023-01-04 DIAGNOSIS — M19.011 ARTHRITIS OF RIGHT SHOULDER REGION: Primary | ICD-10-CM

## 2023-01-04 PROCEDURE — 73030 X-RAY EXAM OF SHOULDER: CPT

## 2023-02-10 ENCOUNTER — TRANSCRIBE ORDERS (OUTPATIENT)
Dept: ADMINISTRATIVE | Facility: HOSPITAL | Age: 43
End: 2023-02-10
Payer: COMMERCIAL

## 2023-02-10 DIAGNOSIS — M25.511 RIGHT SHOULDER PAIN, UNSPECIFIED CHRONICITY: Primary | ICD-10-CM

## 2023-02-16 ENCOUNTER — TELEPHONE (OUTPATIENT)
Dept: FAMILY MEDICINE CLINIC | Age: 43
End: 2023-02-16

## 2023-02-16 NOTE — TELEPHONE ENCOUNTER
If he was placed on pantoprazole and baclofen as the record indicates, there is no other immediate medication that I would recommend giving for this.  Having said that, hiccups can be a sign of underlying problem with the esophagus, upper stomach, or other issue.  This should probably be evaluated.  I would recommend he continue the prescribed medications for now and have a scheduled follow-up visit to assess his response.  If he needs to be seen sooner than next week, he might need to see another provider.  Thanks.

## 2023-02-16 NOTE — TELEPHONE ENCOUNTER
Caller: Latrell Morales    Relationship: Self    Best call back number: [phone]    What medication are you requesting: SOMETHING FOR PERSISTENT HICCUPS    What are your current symptoms: HICCUPS FOR 4 DAYS    Have you had these symptoms before:    [x] Yes  [] No    Have you been treated for these symptoms before:   [x] Yes  [] No    If a prescription is needed, what is your preferred pharmacy and phone number: [facility] - [place], [place] - [address] [facility] [address] [facility] [phone] [facility] [phone] FX     Additional notes:    PATIENT STATES HE WENT TO THE ER THIS MORNING AND THEY GAVE HIM MEDICATION BUT IT ONLY LASTED FOR 1 HOUR AND HE IS HICCUPPING AGAIN. PATIENT STATES HE HAS BEEN UNABLE TO SLEEP.

## 2023-02-17 ENCOUNTER — OFFICE VISIT (OUTPATIENT)
Dept: FAMILY MEDICINE CLINIC | Age: 43
End: 2023-02-17
Payer: COMMERCIAL

## 2023-02-17 VITALS
RESPIRATION RATE: 17 BRPM | WEIGHT: 229.6 LBS | HEIGHT: 69 IN | HEART RATE: 110 BPM | SYSTOLIC BLOOD PRESSURE: 117 MMHG | BODY MASS INDEX: 34 KG/M2 | DIASTOLIC BLOOD PRESSURE: 76 MMHG

## 2023-02-17 DIAGNOSIS — R06.6 INTRACTABLE HICCUPS: Primary | ICD-10-CM

## 2023-02-17 PROCEDURE — 99213 OFFICE O/P EST LOW 20 MIN: CPT | Performed by: NURSE PRACTITIONER

## 2023-02-17 RX ORDER — METOCLOPRAMIDE 5 MG/1
5 TABLET ORAL
Qty: 30 TABLET | Refills: 0 | Status: SHIPPED | OUTPATIENT
Start: 2023-02-17 | End: 2023-02-23

## 2023-02-17 RX ORDER — PANTOPRAZOLE SODIUM 20 MG/1
20 TABLET, DELAYED RELEASE ORAL DAILY
COMMUNITY
Start: 2023-02-16 | End: 2023-02-23 | Stop reason: SDUPTHER

## 2023-02-17 RX ORDER — BACLOFEN 10 MG/1
1 TABLET ORAL DAILY
COMMUNITY
Start: 2023-02-16 | End: 2023-02-23

## 2023-02-17 NOTE — PROGRESS NOTES
"Latrell Morales presents to [facility] with complaint of  Hiccups (Reports ongoing for more than 3 days. Reports this occurrence has been going on for 5 days. Reports went to the ER and was given a muscle relaxer and has not controlled the symptoms for more than 30 minutes. Has been taking Baclofen and over the counter prevacid. Was called in pantoprazole and has not yet picked up medication from pharmacy)    SUBJECTIVE  History of Present Illness     Patient is here for intractable hiccups.  He says has been going on for the past 5 days.  He had an episode over the past year where he has had hiccups 2 or 3 days in a row but these normally resolve.  He was seen at [facility]  2 times yesterday.  He was given baclofen, Maalox, phenergan, and pantoprazole.  He says the medications worked for 30 minutes and then his hiccups returned.  Patient is to the point where he is in extreme pain.  She denies any vomiting.  He says his whole body is sore from the constant hiccuping.  Patient is extremely upset and angry.  He verbalizes that he \"probably wasted his time coming here today.\"  He is frustrated as \"nobody can do anything for him\" he says. He has tried biting lemon, holding his breath, somebody frightening him, breathing into paper bag.     OBJECTIVE  Vital Signs:   /76 (BP Location: Right arm, Patient Position: Sitting, Cuff Size: Large Adult)   Pulse 110   Resp 17   Ht 175.3 cm (69\")   Wt 104 kg (229 lb 9.6 oz)   BMI 33.91 kg/m²       Physical Exam  Constitutional:       General: He is not in acute distress.     Appearance: Normal appearance. He is not ill-appearing.   HENT:      Head: Normocephalic and atraumatic.      Nose: Nose normal.      Mouth/Throat:      Pharynx: Oropharynx is clear.   Cardiovascular:      Rate and Rhythm: Normal rate and regular rhythm.      Pulses: Normal pulses.      Heart sounds: Normal heart sounds.   Pulmonary:      Effort: Pulmonary effort is " normal. No respiratory distress.      Breath sounds: Normal breath sounds.   Chest:      Chest wall: No tenderness.   Abdominal:      Comments: Constant hiccupping present    Musculoskeletal:         General: Normal range of motion.      Cervical back: Normal range of motion and neck supple.   Skin:     General: Skin is warm and dry.   Neurological:      General: No focal deficit present.      Mental Status: He is alert and oriented to person, place, and time. Mental status is at baseline.   Psychiatric:         Mood and Affect: Mood is anxious. Affect is angry.         Behavior: Behavior normal.            ASSESSMENT AND PLAN:  Diagnoses and all orders for this visit:    1. Intractable hiccups (Primary)  -     H. Pylori Breath Test - Breath, Lung; Future  -     CBC & Differential; Future  -     Comprehensive Metabolic Panel; Future  -     Vitamin B12 & Folate; Future  -     TSH; Future  -     Ambulatory Referral to Gastroenterology  -     metoclopramide (Reglan) 5 MG tablet; Take 1 tablet by mouth 4 (Four) Times a Day Before Meals & at Bedtime.  Dispense: 30 tablet; Refill: 0      Discussed with patient that unfortunately there is not a immediate fix that I can provide today to stop his hiccups.  Patient is angry and upset for this reason.  We will check labs to ensure there is no underlying issues.  Given his epigastric discomfort, we will rule out H. pylori though I have low suspicion for this.  Patient was encouraged to continue to avoid carbonated beverages and alcohol. Patient says that he is likely going to go home and drink a large amount of alcohol in hopes to make him vomit to see if this helps stop his hiccuping.  Patient was advised that oftentimes alcohol can contribute to gastric irritation which could worsen his hiccups.  He was advised to continue baclofen that was prescribed in the ER.  He has not started pantoprazole and was encouraged to pick this medication up as soon as possible.  He was given a  short course of Reglan today to take as directed, originally wanted to give this IM in office today but this medication is not available here.  Referral was placed for gastroenterology as he likely needs scope since this is a recurrent issue.  He has a scheduled follow-up with his PCP on 2-23 and was advised to come to this appointment.    Follow Up   Return for Next scheduled follow up. Patient to notify office with any acute concerns or issues.  Patient verbalizes understanding, agrees with plan of care and has no further questions upon discharge.     Patient was given instructions and counseling regarding his condition or for health maintenance advice. Please see specific information pulled into the AVS if appropriate.     Discussed the importance of following up with any needed screening tests/labs/specialist appointments and any requested follow-up recommended by me today. Importance of maintaining follow-up discussed and patient accepts that missed appointments can delay diagnosis and potentially lead to worsening of conditions.    Part of this note may be an electronic transcription/translation of spoken language to printed text using the Dragon Dictation System.

## 2023-02-23 ENCOUNTER — LAB (OUTPATIENT)
Dept: LAB | Facility: HOSPITAL | Age: 43
End: 2023-02-23
Payer: COMMERCIAL

## 2023-02-23 ENCOUNTER — OFFICE VISIT (OUTPATIENT)
Dept: FAMILY MEDICINE CLINIC | Age: 43
End: 2023-02-23
Payer: COMMERCIAL

## 2023-02-23 VITALS
TEMPERATURE: 98.3 F | HEIGHT: 69 IN | BODY MASS INDEX: 32.97 KG/M2 | SYSTOLIC BLOOD PRESSURE: 126 MMHG | WEIGHT: 222.6 LBS | HEART RATE: 102 BPM | DIASTOLIC BLOOD PRESSURE: 72 MMHG

## 2023-02-23 DIAGNOSIS — E83.51 HYPOCALCEMIA: ICD-10-CM

## 2023-02-23 DIAGNOSIS — R07.9 CHEST PAIN, UNSPECIFIED TYPE: ICD-10-CM

## 2023-02-23 DIAGNOSIS — R10.13 EPIGASTRIC PAIN: ICD-10-CM

## 2023-02-23 DIAGNOSIS — R53.83 FATIGUE, UNSPECIFIED TYPE: ICD-10-CM

## 2023-02-23 DIAGNOSIS — R06.6 INTRACTABLE HICCUPS: ICD-10-CM

## 2023-02-23 DIAGNOSIS — K20.90 ESOPHAGITIS: ICD-10-CM

## 2023-02-23 DIAGNOSIS — R06.6 INTRACTABLE HICCUPS: Primary | ICD-10-CM

## 2023-02-23 LAB
AMYLASE SERPL-CCNC: 41 U/L (ref 28–100)
BASOPHILS # BLD AUTO: 0.03 10*3/MM3 (ref 0–0.2)
BASOPHILS NFR BLD AUTO: 0.6 % (ref 0–1.5)
DEPRECATED RDW RBC AUTO: 43.1 FL (ref 37–54)
EOSINOPHIL # BLD AUTO: 0.16 10*3/MM3 (ref 0–0.4)
EOSINOPHIL NFR BLD AUTO: 3 % (ref 0.3–6.2)
ERYTHROCYTE [DISTWIDTH] IN BLOOD BY AUTOMATED COUNT: 12.7 % (ref 12.3–15.4)
HCT VFR BLD AUTO: 45.6 % (ref 37.5–51)
HGB BLD-MCNC: 14.5 G/DL (ref 13–17.7)
IMM GRANULOCYTES # BLD AUTO: 0.02 10*3/MM3 (ref 0–0.05)
IMM GRANULOCYTES NFR BLD AUTO: 0.4 % (ref 0–0.5)
LIPASE SERPL-CCNC: 38 U/L (ref 13–60)
LYMPHOCYTES # BLD AUTO: 1.52 10*3/MM3 (ref 0.7–3.1)
LYMPHOCYTES NFR BLD AUTO: 28.6 % (ref 19.6–45.3)
MAGNESIUM SERPL-MCNC: 2.4 MG/DL (ref 1.6–2.6)
MCH RBC QN AUTO: 29.2 PG (ref 26.6–33)
MCHC RBC AUTO-ENTMCNC: 31.8 G/DL (ref 31.5–35.7)
MCV RBC AUTO: 91.8 FL (ref 79–97)
MONOCYTES # BLD AUTO: 0.44 10*3/MM3 (ref 0.1–0.9)
MONOCYTES NFR BLD AUTO: 8.3 % (ref 5–12)
NEUTROPHILS NFR BLD AUTO: 3.15 10*3/MM3 (ref 1.7–7)
NEUTROPHILS NFR BLD AUTO: 59.1 % (ref 42.7–76)
NRBC BLD AUTO-RTO: 0 /100 WBC (ref 0–0.2)
PLATELET # BLD AUTO: 229 10*3/MM3 (ref 140–450)
PMV BLD AUTO: 10 FL (ref 6–12)
PTH-INTACT SERPL-MCNC: 13.5 PG/ML (ref 15–65)
RBC # BLD AUTO: 4.97 10*6/MM3 (ref 4.14–5.8)
UREA BREATH TEST QL: NEGATIVE
WBC NRBC COR # BLD: 5.32 10*3/MM3 (ref 3.4–10.8)

## 2023-02-23 PROCEDURE — 85025 COMPLETE CBC W/AUTO DIFF WBC: CPT

## 2023-02-23 PROCEDURE — 82150 ASSAY OF AMYLASE: CPT

## 2023-02-23 PROCEDURE — 82746 ASSAY OF FOLIC ACID SERUM: CPT

## 2023-02-23 PROCEDURE — 36415 COLL VENOUS BLD VENIPUNCTURE: CPT

## 2023-02-23 PROCEDURE — 99214 OFFICE O/P EST MOD 30 MIN: CPT | Performed by: FAMILY MEDICINE

## 2023-02-23 PROCEDURE — 83735 ASSAY OF MAGNESIUM: CPT

## 2023-02-23 PROCEDURE — 83690 ASSAY OF LIPASE: CPT

## 2023-02-23 PROCEDURE — 80053 COMPREHEN METABOLIC PANEL: CPT

## 2023-02-23 PROCEDURE — 84443 ASSAY THYROID STIM HORMONE: CPT

## 2023-02-23 PROCEDURE — 82607 VITAMIN B-12: CPT

## 2023-02-23 PROCEDURE — 83970 ASSAY OF PARATHORMONE: CPT

## 2023-02-23 PROCEDURE — 83013 H PYLORI (C-13) BREATH: CPT

## 2023-02-23 RX ORDER — PROMETHAZINE HYDROCHLORIDE 25 MG/1
25 TABLET ORAL EVERY 6 HOURS PRN
COMMUNITY
End: 2023-02-23

## 2023-02-23 RX ORDER — CHLORPROMAZINE HYDROCHLORIDE 25 MG/1
25 TABLET, FILM COATED ORAL 3 TIMES DAILY
Qty: 60 TABLET | Refills: 0 | Status: SHIPPED | OUTPATIENT
Start: 2023-02-23 | End: 2023-03-02

## 2023-02-23 RX ORDER — PANTOPRAZOLE SODIUM 40 MG/1
40 TABLET, DELAYED RELEASE ORAL DAILY
Qty: 30 TABLET | Refills: 0 | Status: SHIPPED | OUTPATIENT
Start: 2023-02-23 | End: 2023-03-02 | Stop reason: SDUPTHER

## 2023-02-23 NOTE — PROGRESS NOTES
"Latrell Morales presents to [facility].    Chief Complaint:  Follow up on hiccups    Subjective       History of Present Illness:  Latrell is in today for evaluation of hiccups.  He has had hiccups present for the last 10 days.  He was seen at the emergency department locally and was prescribed pantoprazole and baclofen.  He did not see improvement and was seen here last week.  He was prescribed metoclopramide at that time.  He is continuing to have hiccups.  These will come and go.  He is somewhat comfortable right now, but he says they will return \"with a vengeance\".  He is not resting well because of this.  He states he is also lost weight because he is not able to eat.  The hiccups are worsened with eating or drinking.  His weight is down about 7 pounds from his visit last week.  This is the third episode of hiccups that he has had like this in the last 12 months.  He has not had significant imaging or endoscopy other than the chest x-ray which was done in the emergency department recently.  Labs from [facility] showed a calcium level of 7.0.    Review of Systems:  Review of Systems   Constitutional: Negative for chills and fever.   Respiratory: Positive for shortness of breath (with eating - when hiccups are present). Negative for cough.    Cardiovascular: Positive for chest pain. Negative for palpitations.   Gastrointestinal: Positive for abdominal pain (Epigastric) and vomiting (twice with some blood present). Negative for nausea.        Objective   Medical History:  Past Medical History:   • Abnormal electrocardiogram   • Abnormal level of hormones in specimens from other organs, systems and tissues   • High risk sexual behavior   • Insomnia   • Nontoxic goiter   • Other specified anxiety disorders     Past Surgical History:   • KNEE SURGERY    xfive- titanium plate and screws   • VASECTOMY      Family History   Problem Relation Age of Onset   • No Known Problems Mother    • Suicidality " Father         cause of death   • No Known Problems Sister    • Coronary artery disease Maternal Grandmother    • Heart failure Maternal Grandfather         cause of death   • Heart attack Paternal Grandfather         cause of death     Social History     Tobacco Use   • Smoking status: Never   • Smokeless tobacco: Never   Substance Use Topics   • Alcohol use: Yes     Comment: rare       Health Maintenance Due   Topic Date Due   • Pneumococcal Vaccine 0-64 (1 - PCV) Never done        Immunization History   Administered Date(s) Administered   • Tdap 05/03/2017       Allergies   Allergen Reactions   • Adhesive Tape Hives and Rash   • Latex Rash   • Sertraline Hcl Other (See Comments)     Erectile dysfunction        Medications:  Current Outpatient Medications on File Prior to Visit   Medication Sig   • ibuprofen (ADVIL,MOTRIN) 800 MG tablet TAKE ONE TABLET BY MOUTH EVERY 8 HOURS AS NEEDED FOR mild OR moderate pain   • sildenafil (Viagra) 100 MG tablet Take 1/2 to 1 tablet an hour prior to sex   • [DISCONTINUED] baclofen (LIORESAL) 10 MG tablet Take 1 tablet by mouth Daily.   • [DISCONTINUED] metoclopramide (Reglan) 5 MG tablet Take 1 tablet by mouth 4 (Four) Times a Day Before Meals & at Bedtime.   • [DISCONTINUED] pantoprazole (PROTONIX) 20 MG EC tablet Take 20 mg by mouth Daily.   • [DISCONTINUED] promethazine (PHENERGAN) 25 MG tablet Take 25 mg by mouth Every 6 (Six) Hours As Needed for Nausea or Vomiting.   • [DISCONTINUED] buPROPion XL (WELLBUTRIN XL) 150 MG 24 hr tablet TAKE ONE TABLET BY MOUTH EVERY DAY   • [DISCONTINUED] carvedilol (COREG) 3.125 MG tablet Take 1 tablet by mouth 2 (Two) Times a Day.   • [DISCONTINUED] traZODone (DESYREL) 50 MG tablet Take 1 tablet by mouth At Night As Needed for Sleep.   • [DISCONTINUED] venlafaxine XR (Effexor XR) 75 MG 24 hr capsule Take 1 capsule by mouth Daily.     No current facility-administered medications on file prior to visit.       Vital Signs:   /72 (BP  "Location: Left arm, Patient Position: Sitting)   Pulse 102   Temp 98.3 °F (36.8 °C) (Oral)   Ht 175.3 cm (69.02\")   Wt 101 kg (222 lb 9.6 oz)   BMI 32.86 kg/m²       Physical Exam:  Physical Exam  Vitals reviewed.   Constitutional:       General: He is not in acute distress.     Appearance: He is ill-appearing (he looks exhausted).   Eyes:      Pupils: Pupils are equal, round, and reactive to light.   Neck:      Comments: No thyromegaly  Cardiovascular:      Rate and Rhythm: Normal rate and regular rhythm.   Pulmonary:      Effort: Pulmonary effort is normal.      Breath sounds: Normal breath sounds.   Abdominal:      General: There is no distension.      Palpations: Abdomen is soft.      Tenderness: There is no abdominal tenderness.   Musculoskeletal:      Cervical back: Neck supple.   Lymphadenopathy:      Cervical: No cervical adenopathy.   Skin:     Findings: No lesion or rash.   Neurological:      Mental Status: He is alert.         Result Review      The following data was reviewed by Raffi Pierson MD on 02/23/2023.  Lab Results   Component Value Date    WBC 5.87 05/02/2022    HGB 16.2 05/02/2022    HCT 48.7 05/02/2022    MCV 92.6 05/02/2022     05/02/2022     Lab Results   Component Value Date    GLUCOSE 66 05/02/2022    BUN 18 05/02/2022    CREATININE 1.32 (H) 05/02/2022     05/02/2022    K 4.7 05/02/2022     05/02/2022    CO2 25.5 05/02/2022    CALCIUM 8.9 05/02/2022    PROTEINTOT 6.2 05/02/2022    ALBUMIN 4.00 05/02/2022    ALT 33 05/02/2022    AST 27 05/02/2022    ALKPHOS 40 05/02/2022    BILITOT 0.4 05/02/2022    EGFR 69.5 05/02/2022    GLOB 2.2 05/02/2022    AGRATIO 1.8 05/02/2022    BCR 13.6 05/02/2022    ANIONGAP 9.5 05/02/2022      Lab Results   Component Value Date    CHOL 108 05/02/2022    CHLPL 130 11/17/2020    TRIG 62 05/02/2022    HDL 39 (L) 05/02/2022    LDL 55 05/02/2022     Lab Results   Component Value Date    TSH 2.750 05/02/2022     No results found for: " HGBA1C  Lab Results   Component Value Date    PSA 0.882 05/02/2022    PSA 0.74 11/17/2020     XR Chest 1 View (02/18/2023 11:44)  CBC with Auto Diff (02/18/2023 12:19)  COMPREHENSIVE METABOLIC PANEL (02/18/2023 12:19)  LIPASE (02/18/2023 12:19)  High Sensitivity Troponin I (02/18/2023 12:19)         Assessment and Plan:   Latrell is struggling.  He has had intractable hiccups for the last 10 days.  He is exhausted and not tolerating p.o. well.  We will try to expedite evaluation including CT and testing as noted below.  I am going to put him on a higher dose of pantoprazole and add chlorpromazine as well.  Risk of sleepiness and other potential side effects discussed.  I stressed to him that he should not take the chlorpromazine with the other medications including metoclopramide, promethazine, and baclofen.  Additionally, he is struggling to function at work.  For this reason, we will keep him off work with the plan to tentatively return to work on 3/13/2023.  I anticipate referral to general surgery or GI after reviewing his CT.  It is unlikely we would be able to get that accomplished before the weekend though.  If he has worsening symptoms, I have asked him to go to the emergency department at [facility].  In that case, he might need admission until he can be evaluated appropriately including upper endoscopy.  We will move ahead as below.       Diagnoses and all orders for this visit:    1. Intractable hiccups (Primary)  -     CT Chest With Contrast; Future  -     CT Abdomen Pelvis With Contrast; Future  -     pantoprazole (PROTONIX) 40 MG EC tablet; Take 1 tablet by mouth Daily.  Dispense: 30 tablet; Refill: 0  -     chlorproMAZINE (THORAZINE) 25 MG tablet; Take 1 tablet by mouth 3 (Three) Times a Day.  Dispense: 60 tablet; Refill: 0    2. Chest pain, unspecified type  -     CT Chest With Contrast; Future  -     CT Abdomen Pelvis With Contrast; Future    3. Epigastric pain  -     CBC Auto Differential;  Future  -     Comprehensive Metabolic Panel; Future  -     Amylase; Future  -     Lipase; Future  -     H. Pylori Breath Test - Breath, Lung; Future  -     CT Chest With Contrast; Future  -     CT Abdomen Pelvis With Contrast; Future    4. Hypocalcemia  -     PTH, Intact; Future  -     Magnesium; Future    5. Fatigue, unspecified type  -     TSH; Future  -     Vitamin B12 & Folate; Future    Follow Up   Return if symptoms worsen or fail to improve.  Patient was given instructions and counseling regarding his condition or for health maintenance advice. Please see specific information pulled into the AVS if appropriate.

## 2023-02-24 ENCOUNTER — TELEPHONE (OUTPATIENT)
Dept: FAMILY MEDICINE CLINIC | Age: 43
End: 2023-02-24
Payer: COMMERCIAL

## 2023-02-24 ENCOUNTER — HOSPITAL ENCOUNTER (OUTPATIENT)
Dept: CT IMAGING | Facility: HOSPITAL | Age: 43
Discharge: HOME | End: 2023-02-24
Admitting: FAMILY MEDICINE
Payer: COMMERCIAL

## 2023-02-24 DIAGNOSIS — R07.9 CHEST PAIN, UNSPECIFIED TYPE: ICD-10-CM

## 2023-02-24 DIAGNOSIS — R06.6 INTRACTABLE HICCUPS: ICD-10-CM

## 2023-02-24 DIAGNOSIS — R10.13 EPIGASTRIC PAIN: ICD-10-CM

## 2023-02-24 LAB
ALBUMIN SERPL-MCNC: 3.6 G/DL (ref 3.5–5.2)
ALBUMIN/GLOB SERPL: 1.3 G/DL
ALP SERPL-CCNC: 29 U/L (ref 39–117)
ALT SERPL W P-5'-P-CCNC: 23 U/L (ref 1–41)
ANION GAP SERPL CALCULATED.3IONS-SCNC: 7.7 MMOL/L (ref 5–15)
AST SERPL-CCNC: 17 U/L (ref 1–40)
BILIRUB SERPL-MCNC: 0.4 MG/DL (ref 0–1.2)
BUN SERPL-MCNC: 16 MG/DL (ref 6–20)
BUN/CREAT SERPL: 15.5 (ref 7–25)
CALCIUM SPEC-SCNC: 8.5 MG/DL (ref 8.6–10.5)
CHLORIDE SERPL-SCNC: 103 MMOL/L (ref 98–107)
CO2 SERPL-SCNC: 25.3 MMOL/L (ref 22–29)
CREAT SERPL-MCNC: 1.03 MG/DL (ref 0.76–1.27)
EGFRCR SERPLBLD CKD-EPI 2021: 93 ML/MIN/1.73
FOLATE SERPL-MCNC: 12.4 NG/ML (ref 4.78–24.2)
GLOBULIN UR ELPH-MCNC: 2.8 GM/DL
GLUCOSE SERPL-MCNC: 88 MG/DL (ref 65–99)
POTASSIUM SERPL-SCNC: 4.6 MMOL/L (ref 3.5–5.2)
PROT SERPL-MCNC: 6.4 G/DL (ref 6–8.5)
SODIUM SERPL-SCNC: 136 MMOL/L (ref 136–145)
TSH SERPL DL<=0.05 MIU/L-ACNC: 0.87 UIU/ML (ref 0.27–4.2)
VIT B12 BLD-MCNC: 1142 PG/ML (ref 211–946)

## 2023-02-24 PROCEDURE — 25510000001 IOPAMIDOL PER 1 ML: Performed by: FAMILY MEDICINE

## 2023-02-24 PROCEDURE — 71260 CT THORAX DX C+: CPT

## 2023-02-24 PROCEDURE — 74177 CT ABD & PELVIS W/CONTRAST: CPT

## 2023-02-24 RX ADMIN — IOPAMIDOL 100 ML: 755 INJECTION, SOLUTION INTRAVENOUS at 14:24

## 2023-02-27 ENCOUNTER — HOSPITAL ENCOUNTER (OUTPATIENT)
Dept: MRI IMAGING | Facility: HOSPITAL | Age: 43
Discharge: HOME | End: 2023-02-27
Admitting: PHYSICIAN ASSISTANT
Payer: COMMERCIAL

## 2023-02-27 ENCOUNTER — PRIOR AUTHORIZATION (OUTPATIENT)
Dept: FAMILY MEDICINE CLINIC | Age: 43
End: 2023-02-27
Payer: COMMERCIAL

## 2023-02-27 DIAGNOSIS — M25.511 RIGHT SHOULDER PAIN, UNSPECIFIED CHRONICITY: ICD-10-CM

## 2023-02-27 PROCEDURE — 73221 MRI JOINT UPR EXTREM W/O DYE: CPT

## 2023-02-27 NOTE — TELEPHONE ENCOUNTER
I would recommend preauthorizing for this particular condition.  This is an unusual situation and the other medications are not indicated.  Thanks.

## 2023-02-27 NOTE — TELEPHONE ENCOUNTER
PA for Chlorpromazine  Key: [id number]    Formulary alternatives:   Thioridazine tablet, Perphenazine tablet, Fluphenazine oral concentrate, Fluphenazine oral elixir.     PA or change?

## 2023-02-28 NOTE — TELEPHONE ENCOUNTER
Approved until 12/31/2023  Auth number: [id number]    Called pt to inform, voicemail box not set up.

## 2023-03-01 ENCOUNTER — OFFICE VISIT (OUTPATIENT)
Dept: SURGERY | Facility: CLINIC | Age: 43
End: 2023-03-01
Payer: COMMERCIAL

## 2023-03-01 ENCOUNTER — PREP FOR SURGERY (OUTPATIENT)
Dept: OTHER | Facility: HOSPITAL | Age: 43
End: 2023-03-01
Payer: COMMERCIAL

## 2023-03-01 VITALS — WEIGHT: 218 LBS | RESPIRATION RATE: 16 BRPM | BODY MASS INDEX: 31.21 KG/M2 | HEIGHT: 70 IN

## 2023-03-01 DIAGNOSIS — K44.9 HIATAL HERNIA: Primary | ICD-10-CM

## 2023-03-01 DIAGNOSIS — R06.6 HICCUPS: Primary | ICD-10-CM

## 2023-03-01 PROCEDURE — 99203 OFFICE O/P NEW LOW 30 MIN: CPT | Performed by: SURGERY

## 2023-03-01 RX ORDER — PROMETHAZINE HYDROCHLORIDE 25 MG/1
TABLET ORAL
COMMUNITY
End: 2023-03-02

## 2023-03-01 RX ORDER — HYDROCODONE BITARTRATE AND ACETAMINOPHEN 5; 325 MG/1; MG/1
TABLET ORAL
COMMUNITY
End: 2023-03-02

## 2023-03-01 RX ORDER — CYCLOBENZAPRINE HCL 5 MG
5 TABLET ORAL EVERY 8 HOURS PRN
Qty: 30 TABLET | Refills: 1 | Status: SHIPPED | OUTPATIENT
Start: 2023-03-01 | End: 2023-03-22

## 2023-03-01 RX ORDER — METOCLOPRAMIDE 5 MG/1
10 TABLET ORAL 3 TIMES DAILY
COMMUNITY
End: 2023-03-02 | Stop reason: SDUPTHER

## 2023-03-01 RX ORDER — BACLOFEN 10 MG/1
TABLET ORAL
COMMUNITY
End: 2023-03-02

## 2023-03-01 RX ORDER — PANTOPRAZOLE SODIUM 20 MG/1
TABLET, DELAYED RELEASE ORAL
COMMUNITY
End: 2023-03-02

## 2023-03-01 NOTE — H&P (VIEW-ONLY)
Chief Complaint        Intractable hiccups      History of Present Illness      Latrell Morales is a 42 y.o. male who presents to [facility] as a new patient with a history of intractable hiccups that have been present for 17 days.  Patient reports that he has been seen in the emergency department 3 times related to continued hiccups he was placed on baclofen, Thorazine, Reglan, Flexeril, Protonix and Phenergan with no relief.  Patient had CT scan performed of the chest which displayed a moderate size hiatal hernia and the wall of the esophagus being mildly and diffusely thickened which may be manifestation of esophagitis.  He reports the longest relief of hiccups he is experience was 1 to 2 hours over the past 17 days.  He was placed on multiple medications in the ER and reports minimal relief.  He reports vomiting at least once a day.  He reports a fresh bloody appearance to the vomiting on the second day of hiccups but none since.  No family history of colon cancer or gastric cancer.  He reports chest soreness and weight loss of 14 pounds as he has not been able to eat.    No colon or EGD on file for this patient     CT abdomen and pelvis w/contrast on 02.24.2023 moderate size hiatal hernia mild diffuse thickening of the esophagus    Most recent labs on 02.23.2023 see below.  Results       Result Review :   The following data was reviewed by: SHARONDA Peters on 03/02/2023     CMP    CMP 5/2/22 2/23/23   Glucose 66 88   BUN 18 16   Creatinine 1.32 (A) 1.03   eGFR 69.5 93.0   Sodium 141 136   Potassium 4.7 4.6   Chloride 106 103   Calcium 8.9 8.5 (A)   Total Protein 6.2 6.4   Albumin 4.00 3.6   Globulin 2.2 2.8   Total Bilirubin 0.4 0.4   Alkaline Phosphatase 40 29 (A)   AST (SGOT) 27 17   ALT (SGPT) 33 23   Albumin/Globulin Ratio 1.8 1.3   BUN/Creatinine Ratio 13.6 15.5   Anion Gap 9.5 7.7   (A) Abnormal value       Comments are available for some flowsheets but are not being  displayed.           CBC    CBC 5/2/22 2/23/23   WBC 5.87 5.32   RBC 5.26 4.97   Hemoglobin 16.2 14.5   Hematocrit 48.7 45.6   MCV 92.6 91.8   MCH 30.8 29.2   MCHC 33.3 31.8   RDW 13.2 12.7   Platelets 200 229                      Past Medical History       Past Medical History:   Diagnosis Date   • Abnormal electrocardiogram    • Abnormal level of hormones in specimens from other organs, systems and tissues    • High risk sexual behavior    • Insomnia    • Nontoxic goiter    • Other specified anxiety disorders        Past Surgical History:   Procedure Laterality Date   • KNEE SURGERY Left     xfive- titanium plate and screws   • VASECTOMY           Current Outpatient Medications:   •  baclofen (LIORESAL) 10 MG tablet, Take 1 tablet by mouth 3 (Three) Times a Day., Disp: 90 tablet, Rfl: 0  •  cyclobenzaprine (FLEXERIL) 5 MG tablet, Take 1 tablet by mouth Every 8 (Eight) Hours As Needed for Muscle Spasms for up to 21 days., Disp: 30 tablet, Rfl: 1  •  metoclopramide (REGLAN) 10 MG tablet, Take 1 tablet by mouth 3 (Three) Times a Day for 14 days., Disp: 42 tablet, Rfl: 0  •  pantoprazole (PROTONIX) 40 MG EC tablet, Take 1 tablet by mouth Daily., Disp: 30 tablet, Rfl: 0  •  sildenafil (Viagra) 100 MG tablet, Take 1/2 to 1 tablet an hour prior to sex, Disp: 30 tablet, Rfl: 2  •  simethicone (Gas-X) 80 MG chewable tablet, Chew 1 tablet Every 6 (Six) Hours for 14 days., Disp: 56 tablet, Rfl: 0     Allergies   Allergen Reactions   • Adhesive Tape Hives and Rash   • Latex Rash   • Sertraline Hcl Other (See Comments)     Erectile dysfunction       Family History   Problem Relation Age of Onset   • No Known Problems Mother    • Suicidality Father         cause of death   • No Known Problems Sister    • Coronary artery disease Maternal Grandmother    • Heart failure Maternal Grandfather         cause of death   • Heart attack Paternal Grandfather         cause of death   • Colon cancer Neg Hx         Social History     Social  "History Narrative   • Not on file       Objective       Objective     Vital Signs:   /57 (BP Location: Right arm, Patient Position: Sitting, Cuff Size: Large Adult)   Pulse 93   Ht 175.3 cm (69\")   Wt 99.8 kg (220 lb)   SpO2 100%   BMI 32.49 kg/m²     Body mass index is 32.49 kg/m².    Physical Exam  Constitutional:       General: He is not in acute distress.     Appearance: Normal appearance. He is well-developed and normal weight.   Eyes:      Conjunctiva/sclera: Conjunctivae normal.      Pupils: Pupils are equal, round, and reactive to light.      Visual Fields: Right eye visual fields normal and left eye visual fields normal.   Cardiovascular:      Rate and Rhythm: Normal rate and regular rhythm.      Heart sounds: Normal heart sounds.   Pulmonary:      Effort: Pulmonary effort is normal. No retractions.      Breath sounds: Normal breath sounds and air entry.      Comments: Inspection of chest: normal appearance  Abdominal:      General: Bowel sounds are normal.      Palpations: Abdomen is soft.      Tenderness: There is no abdominal tenderness.      Comments: No appreciable hepatosplenomegaly   Musculoskeletal:      Cervical back: Neck supple.      Right lower leg: No edema.      Left lower leg: No edema.   Lymphadenopathy:      Cervical: No cervical adenopathy.   Skin:     Findings: No lesion.      Comments: Turgor normal   Neurological:      Mental Status: He is alert and oriented to person, place, and time.   Psychiatric:         Mood and Affect: Mood and affect normal.              Assessment & Plan          Assessment and Plan    Diagnoses and all orders for this visit:    1. Hiatal hernia (Primary)    2. Intractable hiccups  -     pantoprazole (PROTONIX) 40 MG EC tablet; Take 1 tablet by mouth Daily.  Dispense: 30 tablet; Refill: 0    3. Abnormal CT scan, esophagus    4. Nausea    5. Nausea and vomiting, unspecified vomiting type    Other orders  -     baclofen (LIORESAL) 10 MG tablet; Take 1 " tablet by mouth 3 (Three) Times a Day.  Dispense: 90 tablet; Refill: 0  -     metoclopramide (REGLAN) 10 MG tablet; Take 1 tablet by mouth 3 (Three) Times a Day for 14 days.  Dispense: 42 tablet; Refill: 0  -     simethicone (Gas-X) 80 MG chewable tablet; Chew 1 tablet Every 6 (Six) Hours for 14 days.  Dispense: 56 tablet; Refill: 0       42-year-old male presenting the office today with a history of hiatal hernia, intractable hiccups, abnormal CT scan, nausea, vomiting and weight loss.  I have recommended that the patient undergo further evaluation with an EGD.  I have discussed this procedure in detail with the patient.  I have discussed the risks, benefits and alternatives.  I have discussed the risk of anesthesia, bleeding and perforation.  Patient understands these risks, benefits and alternatives and wishes to proceed.  I will schedule him for Monday with Dr. Bowles.  I have spoke with Dr. Bowles about this patient's care.  We will proceed with baclofen 10 mg 1 tablet 3 times daily, Reglan 10 mg 3 times daily and simethicone 1 tablet every 6 hours along with Protonix daily.  Patient agreeable to this plan and will follow-up in office after endoscopy.          Follow Up       Follow Up   Return for Follow up after endoscopy in office.  Patient was given instructions and counseling regarding his condition or for health maintenance advice. Please see specific information pulled into the AVS if appropriate.

## 2023-03-02 ENCOUNTER — PREP FOR SURGERY (OUTPATIENT)
Dept: OTHER | Facility: HOSPITAL | Age: 43
End: 2023-03-02
Payer: COMMERCIAL

## 2023-03-02 ENCOUNTER — OFFICE VISIT (OUTPATIENT)
Dept: GASTROENTEROLOGY | Facility: CLINIC | Age: 43
End: 2023-03-02
Payer: COMMERCIAL

## 2023-03-02 VITALS
BODY MASS INDEX: 32.58 KG/M2 | OXYGEN SATURATION: 100 % | HEART RATE: 93 BPM | DIASTOLIC BLOOD PRESSURE: 57 MMHG | HEIGHT: 69 IN | SYSTOLIC BLOOD PRESSURE: 108 MMHG | WEIGHT: 220 LBS

## 2023-03-02 DIAGNOSIS — R93.3 ABNORMAL CT SCAN, ESOPHAGUS: ICD-10-CM

## 2023-03-02 DIAGNOSIS — R06.6 INTRACTABLE HICCUPS: Primary | ICD-10-CM

## 2023-03-02 DIAGNOSIS — K44.9 HIATAL HERNIA: ICD-10-CM

## 2023-03-02 DIAGNOSIS — R11.2 NAUSEA AND VOMITING, UNSPECIFIED VOMITING TYPE: ICD-10-CM

## 2023-03-02 DIAGNOSIS — R06.6 INTRACTABLE HICCUPS: ICD-10-CM

## 2023-03-02 DIAGNOSIS — K44.9 HIATAL HERNIA: Primary | ICD-10-CM

## 2023-03-02 DIAGNOSIS — R11.0 NAUSEA: ICD-10-CM

## 2023-03-02 PROCEDURE — 99214 OFFICE O/P EST MOD 30 MIN: CPT | Performed by: NURSE PRACTITIONER

## 2023-03-02 RX ORDER — METOCLOPRAMIDE 10 MG/1
10 TABLET ORAL 3 TIMES DAILY
Qty: 42 TABLET | Refills: 0 | Status: SHIPPED | OUTPATIENT
Start: 2023-03-02 | End: 2023-03-16

## 2023-03-02 RX ORDER — SIMETHICONE 80 MG
80 TABLET,CHEWABLE ORAL EVERY 6 HOURS
Qty: 56 TABLET | Refills: 0 | Status: SHIPPED | OUTPATIENT
Start: 2023-03-02 | End: 2023-03-30

## 2023-03-02 RX ORDER — BACLOFEN 10 MG/1
10 TABLET ORAL 3 TIMES DAILY
Qty: 90 TABLET | Refills: 0 | Status: SHIPPED | OUTPATIENT
Start: 2023-03-02

## 2023-03-02 RX ORDER — PANTOPRAZOLE SODIUM 40 MG/1
40 TABLET, DELAYED RELEASE ORAL DAILY
Qty: 30 TABLET | Refills: 0 | Status: ON HOLD | OUTPATIENT
Start: 2023-03-02 | End: 2023-03-06 | Stop reason: SDUPTHER

## 2023-03-03 ENCOUNTER — PATIENT ROUNDING (BHMG ONLY) (OUTPATIENT)
Dept: GASTROENTEROLOGY | Facility: CLINIC | Age: 43
End: 2023-03-03
Payer: COMMERCIAL

## 2023-03-03 NOTE — PROGRESS NOTES
3/3/2023      Hello, may I speak with Latrell Morales     My name is Nicolas. I am calling from [facility] [place]. I show that you had a recent visit with SHARONDA Peters.    Before we get started may I verify your date of birth? 1980    I am calling to officially welcome you to our practice and ask about your recent visit. Is this a good time to talk? I attempted to call pt, unable to leave , no  set up.     Tell me about your visit with us. What things went well?         We're always looking for ways to make our patients' experiences even better. Do you have recommendations on ways we may improve?    Overall were you satisfied with your first visit to our practice?    I appreciate you taking the time to speak with me today. Is there anything else I can do for you?    I am glad to hear that you had a very good visit and I appreciate you taking the time to provide feedback on this call. We would greatly appreciate you filling out a survey if you receive one in the mail, email or text. This is a great opportunity to provide any additional feedback that you may think of after this call as well.       Thank you, and have a great day.

## 2023-03-06 ENCOUNTER — ANESTHESIA EVENT (OUTPATIENT)
Dept: GASTROENTEROLOGY | Facility: HOSPITAL | Age: 43
End: 2023-03-06
Payer: COMMERCIAL

## 2023-03-06 ENCOUNTER — HOSPITAL ENCOUNTER (OUTPATIENT)
Facility: HOSPITAL | Age: 43
Setting detail: HOSPITAL OUTPATIENT SURGERY
Discharge: HOME | End: 2023-03-06
Attending: INTERNAL MEDICINE | Admitting: INTERNAL MEDICINE
Payer: COMMERCIAL

## 2023-03-06 ENCOUNTER — ANESTHESIA (OUTPATIENT)
Dept: GASTROENTEROLOGY | Facility: HOSPITAL | Age: 43
End: 2023-03-06
Payer: COMMERCIAL

## 2023-03-06 VITALS
BODY MASS INDEX: 31.97 KG/M2 | DIASTOLIC BLOOD PRESSURE: 61 MMHG | TEMPERATURE: 98.6 F | HEART RATE: 79 BPM | RESPIRATION RATE: 19 BRPM | OXYGEN SATURATION: 97 % | SYSTOLIC BLOOD PRESSURE: 92 MMHG | WEIGHT: 216.49 LBS

## 2023-03-06 DIAGNOSIS — R06.83 SNORING: Primary | ICD-10-CM

## 2023-03-06 DIAGNOSIS — R06.6 INTRACTABLE HICCUPS: ICD-10-CM

## 2023-03-06 DIAGNOSIS — K44.9 HIATAL HERNIA: ICD-10-CM

## 2023-03-06 DIAGNOSIS — R11.0 NAUSEA: ICD-10-CM

## 2023-03-06 DIAGNOSIS — R93.3 ABNORMAL CT SCAN, ESOPHAGUS: ICD-10-CM

## 2023-03-06 DIAGNOSIS — R11.2 NAUSEA AND VOMITING, UNSPECIFIED VOMITING TYPE: ICD-10-CM

## 2023-03-06 PROCEDURE — 43239 EGD BIOPSY SINGLE/MULTIPLE: CPT | Performed by: INTERNAL MEDICINE

## 2023-03-06 PROCEDURE — 88305 TISSUE EXAM BY PATHOLOGIST: CPT | Performed by: INTERNAL MEDICINE

## 2023-03-06 PROCEDURE — 25010000002 PROPOFOL 10 MG/ML EMULSION: Performed by: NURSE ANESTHETIST, CERTIFIED REGISTERED

## 2023-03-06 RX ORDER — DEXMEDETOMIDINE HYDROCHLORIDE 100 UG/ML
INJECTION, SOLUTION INTRAVENOUS AS NEEDED
Status: DISCONTINUED | OUTPATIENT
Start: 2023-03-06 | End: 2023-03-06 | Stop reason: SURG

## 2023-03-06 RX ORDER — LIDOCAINE HYDROCHLORIDE 20 MG/ML
INJECTION, SOLUTION EPIDURAL; INFILTRATION; INTRACAUDAL; PERINEURAL AS NEEDED
Status: DISCONTINUED | OUTPATIENT
Start: 2023-03-06 | End: 2023-03-06 | Stop reason: SURG

## 2023-03-06 RX ORDER — PANTOPRAZOLE SODIUM 40 MG/1
40 TABLET, DELAYED RELEASE ORAL 2 TIMES DAILY
Qty: 60 TABLET | Refills: 3 | Status: SHIPPED | OUTPATIENT
Start: 2023-03-06

## 2023-03-06 RX ORDER — PROPOFOL 10 MG/ML
VIAL (ML) INTRAVENOUS AS NEEDED
Status: DISCONTINUED | OUTPATIENT
Start: 2023-03-06 | End: 2023-03-06 | Stop reason: SURG

## 2023-03-06 RX ORDER — SODIUM CHLORIDE, SODIUM LACTATE, POTASSIUM CHLORIDE, CALCIUM CHLORIDE 600; 310; 30; 20 MG/100ML; MG/100ML; MG/100ML; MG/100ML
30 INJECTION, SOLUTION INTRAVENOUS CONTINUOUS
Status: DISCONTINUED | OUTPATIENT
Start: 2023-03-06 | End: 2023-03-06 | Stop reason: HOSPADM

## 2023-03-06 RX ADMIN — SODIUM CHLORIDE, POTASSIUM CHLORIDE, SODIUM LACTATE AND CALCIUM CHLORIDE 30 ML/HR: 600; 310; 30; 20 INJECTION, SOLUTION INTRAVENOUS at 09:48

## 2023-03-06 RX ADMIN — LIDOCAINE HYDROCHLORIDE 100 MG: 20 INJECTION, SOLUTION EPIDURAL; INFILTRATION; INTRACAUDAL; PERINEURAL at 10:44

## 2023-03-06 RX ADMIN — DEXMEDETOMIDINE HYDROCHLORIDE 10 MCG: 100 INJECTION, SOLUTION, CONCENTRATE INTRAVENOUS at 10:41

## 2023-03-06 RX ADMIN — PROPOFOL 225 MCG/KG/MIN: 10 INJECTION, EMULSION INTRAVENOUS at 10:44

## 2023-03-06 RX ADMIN — PROPOFOL 100 MG: 10 INJECTION, EMULSION INTRAVENOUS at 10:44

## 2023-03-06 RX ADMIN — SODIUM CHLORIDE, POTASSIUM CHLORIDE, SODIUM LACTATE AND CALCIUM CHLORIDE: 600; 310; 30; 20 INJECTION, SOLUTION INTRAVENOUS at 10:37

## 2023-03-06 NOTE — ANESTHESIA PREPROCEDURE EVALUATION
Anesthesia Evaluation     Patient summary reviewed and Nursing notes reviewed                Airway   Mallampati: I  TM distance: >3 FB  Neck ROM: full  No difficulty expected  Dental      Pulmonary - negative pulmonary ROS and normal exam    breath sounds clear to auscultation  Cardiovascular - negative cardio ROS and normal exam    Rhythm: regular  Rate: normal        Neuro/Psych  (+) psychiatric history,    GI/Hepatic/Renal/Endo    (+) obesity,  hiatal hernia,  thyroid problem     Musculoskeletal (-) negative ROS    Abdominal    Substance History - negative use     OB/GYN negative ob/gyn ROS         Other                        Anesthesia Plan    ASA 2     general     intravenous induction     Anesthetic plan, risks, benefits, and alternatives have been provided, discussed and informed consent has been obtained with: patient.        CODE STATUS:

## 2023-03-06 NOTE — ANESTHESIA POSTPROCEDURE EVALUATION
Patient: Latrell Morales    Procedure Summary     Date: 03/06/23 Room / Location: [facility] ENDOSCOPY 2 / [facility] ENDOSCOPY    Anesthesia Start: 1037 Anesthesia Stop: 1056    Procedure: ESOPHAGOGASTRODUODENOSCOPY WITH BIOPSIES Diagnosis:       Intractable hiccups      Hiatal hernia      Abnormal CT scan, esophagus      Nausea      Nausea and vomiting, unspecified vomiting type      (Intractable hiccups [R06.6])      (Hiatal hernia [K44.9])      (Abnormal CT scan, esophagus [R93.3])      (Nausea [R11.0])      (Nausea and vomiting, unspecified vomiting type [R11.2])    Surgeons: Franko Bowles MD Provider: Rajesh Lee MD    Anesthesia Type: general ASA Status: 2          Anesthesia Type: general    Vitals  Vitals Value Taken Time   /78 03/06/23 1122   Temp 37 °C (98.6 °F) 03/06/23 1110   Pulse 72 03/06/23 1123   Resp 19 03/06/23 1110   SpO2 100 % 03/06/23 1123   Vitals shown include unvalidated device data.        Post Anesthesia Care and Evaluation    Patient location during evaluation: bedside  Patient participation: complete - patient participated  Level of consciousness: awake  Pain management: adequate    Airway patency: patent  PONV Status: none  Cardiovascular status: acceptable  Respiratory status: acceptable  Hydration status: acceptable    Comments: An Anesthesiologist personally participated in the most demanding procedures (including induction and emergence if applicable) in the anesthesia plan, monitored the course of anesthesia administration at frequent intervals and remained physically present and available for immediate diagnosis and treatment of emergencies.

## 2023-03-07 ENCOUNTER — PREP FOR SURGERY (OUTPATIENT)
Dept: OTHER | Facility: HOSPITAL | Age: 43
End: 2023-03-07
Payer: COMMERCIAL

## 2023-03-07 LAB
CYTO UR: NORMAL
LAB AP CASE REPORT: NORMAL
LAB AP CLINICAL INFORMATION: NORMAL
PATH REPORT.FINAL DX SPEC: NORMAL
PATH REPORT.GROSS SPEC: NORMAL

## 2023-03-07 NOTE — PROGRESS NOTES
General Surgery/Colorectal Surgery Note    Patient Name:  Latrell Morales  YOB: 1980  [id number]    Referring Provider: Raffi Pierson,*      Patient Care Team:  Raffi Pierson MD as PCP - General (Family Medicine)    Chief complaint hiccups    Subjective .     History of present illness:    He has a 15-day history of hiccups.  History of the same for 2 to 3 days at a time in the past.  Symptoms worse after eating.  No new medications.  14 pound weight loss over the past 2 and half weeks due to unable to eat.  His symptoms will improve with laying flat, eating ice cream as well as drinking beer.  No tobacco use.  No history of diabetes.  No blood thinner use.  Long history of reflux.    Seen in the ED previously.    CT chest 2/24/2023 with moderate size hiatal hernia, wall of the esophagus is mildly and diffusely thickened  CT abdomen pelvis 2/24/2023 with moderate size hiatal hernia  Labs 2/23/2023 with normal magnesium, lipase, TSH, total calcium 8.5, creatinine 1.0    History:  Past Medical History:   Diagnosis Date   • Abnormal electrocardiogram    • Abnormal level of hormones in specimens from other organs, systems and tissues    • High risk sexual behavior    • Insomnia    • Nontoxic goiter    • Other specified anxiety disorders        Past Surgical History:   Procedure Laterality Date   • ENDOSCOPY N/A 3/6/2023    Procedure: ESOPHAGOGASTRODUODENOSCOPY WITH BIOPSIES;  Surgeon: Franko Bowles MD;  Location: [facility] ENDOSCOPY;  Service: Gastroenterology;  Laterality: N/A;  SMALL HIATAL HERNIA, ESOPHAGITIS   • KNEE SURGERY Left     xfive- titanium plate and screws   • VASECTOMY         Family History   Problem Relation Age of Onset   • No Known Problems Mother    • Suicidality Father         cause of death   • No Known Problems Sister    • Coronary artery disease Maternal Grandmother    • Heart failure Maternal Grandfather         cause of death   • Heart attack Paternal  Grandfather         cause of death   • Colon cancer Neg Hx    • Malig Hyperthermia Neg Hx        Social History     Tobacco Use   • Smoking status: Never     Passive exposure: Past   • Smokeless tobacco: Never   Vaping Use   • Vaping Use: Never used   Substance Use Topics   • Alcohol use: Yes     Comment: rare   • Drug use: Never       Review of Systems  All systems were reviewed and negative except for:   Review of Systems   Constitutional: Negative for chills, fever and unexpected weight loss.   HENT: Negative for congestion, nosebleeds and voice change.    Eyes: Negative for blurred vision, double vision and discharge.   Respiratory: Negative for apnea, chest tightness and shortness of breath.    Cardiovascular: Negative for chest pain and leg swelling.   Gastrointestinal:        See HPI   Endocrine: Negative for cold intolerance and heat intolerance.   Genitourinary: Negative for dysuria, hematuria and urgency.   Musculoskeletal: Negative for back pain, joint swelling and neck pain.   Skin: Negative for color change and dry skin.   Neurological: Negative for dizziness and confusion.   Hematological: Negative for adenopathy.   Psychiatric/Behavioral: Negative for agitation and behavioral problems.     MEDS:  Prior to Admission medications    Medication Sig Start Date End Date Taking? Authorizing Provider   sildenafil (Viagra) 100 MG tablet Take 1/2 to 1 tablet an hour prior to sex 11/16/22  Yes Raffi Pierson MD   baclofen (LIORESAL) 10 MG tablet Take 1 tablet by mouth 3 (Three) Times a Day. 3/2/23   Criselda Cortes APRN   cyclobenzaprine (FLEXERIL) 5 MG tablet Take 1 tablet by mouth Every 8 (Eight) Hours As Needed for Muscle Spasms for up to 21 days. 3/1/23 3/22/23  Fausto Madrid MD   metoclopramide (REGLAN) 10 MG tablet Take 1 tablet by mouth 3 (Three) Times a Day for 14 days. 3/2/23 3/16/23  Criselda Cortes APRN   pantoprazole (PROTONIX) 40 MG EC tablet Take 1 tablet by mouth 2 (Two) Times a Day.  "3/6/23   Franko Bowles MD   simethicone (Gas-X) 80 MG chewable tablet Chew 1 tablet Every 6 (Six) Hours for 14 days. 3/2/23 3/16/23  Criselda Cortes APRN   buPROPion XL (WELLBUTRIN XL) 150 MG 24 hr tablet TAKE ONE TABLET BY MOUTH EVERY DAY 3/10/22 3/16/22  Raffi Pierson MD        Allergies:  Adhesive tape, Latex, and Sertraline hcl    Objective     Vital Signs        Physical Exam:     General Appearance:    Alert, cooperative, in no acute distress   Head:    Normocephalic, without obvious abnormality, atraumatic   Eyes:          Conjunctivae and sclerae normal, no icterus,     Ears:    Ears appear intact with no abnormalities noted   Throat:   No oral lesions, no thrush, oral mucosa moist   Neck:   No adenopathy, supple, trachea midline, no thyromegaly   Back:     No kyphosis present, no scoliosis present, no skin lesions,      erythema or scars, no tenderness to percussion or                   palpation,   range of motion normal   Lungs:     Clear to auscultation,respirations regular, even and                  unlabored    Heart:    Regular rhythm and normal rate, normal S1 and S2, no            murmur, no gallop, no rub, no click   Chest Wall:    No abnormalities observed   Abdomen:     Normal bowel sounds, no masses, no organomegaly, soft        non-tender, non-distended, no guarding, no rebound                tenderness   Rectal:        Extremities:   Moves all extremities well, no edema, no cyanosis, no             redness   Pulses:   Pulses palpable and equal bilaterally   Skin:   No bleeding, bruising or rash   Lymph nodes:   No palpable adenopathy   Neurologic:   A/o x 4 with no deficits       Results Review:   {Results Review:[id number]::\"I reviewed the patient's new clinical results.\"    LABS/IMAGING:  Results for orders placed or performed in visit on 02/23/23   H. Pylori Breath Test - Breath, Lung    Specimen: Lung; Breath   Result Value Ref Range    H.pylori Breath Test Negative Negative "   Comprehensive Metabolic Panel    Specimen: Blood   Result Value Ref Range    Glucose 88 65 - 99 mg/dL    BUN 16 6 - 20 mg/dL    Creatinine 1.03 0.76 - 1.27 mg/dL    Sodium 136 136 - 145 mmol/L    Potassium 4.6 3.5 - 5.2 mmol/L    Chloride 103 98 - 107 mmol/L    CO2 25.3 22.0 - 29.0 mmol/L    Calcium 8.5 (L) 8.6 - 10.5 mg/dL    Total Protein 6.4 6.0 - 8.5 g/dL    Albumin 3.6 3.5 - 5.2 g/dL    ALT (SGPT) 23 1 - 41 U/L    AST (SGOT) 17 1 - 40 U/L    Alkaline Phosphatase 29 (L) 39 - 117 U/L    Total Bilirubin 0.4 0.0 - 1.2 mg/dL    Globulin 2.8 gm/dL    A/G Ratio 1.3 g/dL    BUN/Creatinine Ratio 15.5 7.0 - 25.0    Anion Gap 7.7 5.0 - 15.0 mmol/L    eGFR 93.0 >60.0 mL/min/1.73   Vitamin B12 & Folate    Specimen: Blood   Result Value Ref Range    Folate 12.40 4.78 - 24.20 ng/mL    Vitamin B-12 1,142 (H) 211 - 946 pg/mL   TSH    Specimen: Blood   Result Value Ref Range    TSH 0.873 0.270 - 4.200 uIU/mL   Amylase    Specimen: Blood   Result Value Ref Range    Amylase 41 28 - 100 U/L   Lipase    Specimen: Blood   Result Value Ref Range    Lipase 38 13 - 60 U/L   PTH, Intact    Specimen: Blood   Result Value Ref Range    PTH, Intact 13.5 (L) 15.0 - 65.0 pg/mL   Magnesium    Specimen: Blood   Result Value Ref Range    Magnesium 2.4 1.6 - 2.6 mg/dL   CBC Auto Differential    Specimen: Blood   Result Value Ref Range    WBC 5.32 3.40 - 10.80 10*3/mm3    RBC 4.97 4.14 - 5.80 10*6/mm3    Hemoglobin 14.5 13.0 - 17.7 g/dL    Hematocrit 45.6 37.5 - 51.0 %    MCV 91.8 79.0 - 97.0 fL    MCH 29.2 26.6 - 33.0 pg    MCHC 31.8 31.5 - 35.7 g/dL    RDW 12.7 12.3 - 15.4 %    RDW-SD 43.1 37.0 - 54.0 fl    MPV 10.0 6.0 - 12.0 fL    Platelets 229 140 - 450 10*3/mm3    Neutrophil % 59.1 42.7 - 76.0 %    Lymphocyte % 28.6 19.6 - 45.3 %    Monocyte % 8.3 5.0 - 12.0 %    Eosinophil % 3.0 0.3 - 6.2 %    Basophil % 0.6 0.0 - 1.5 %    Immature Grans % 0.4 0.0 - 0.5 %    Neutrophils, Absolute 3.15 1.70 - 7.00 10*3/mm3    Lymphocytes, Absolute 1.52 0.70  - 3.10 10*3/mm3    Monocytes, Absolute 0.44 0.10 - 0.90 10*3/mm3    Eosinophils, Absolute 0.16 0.00 - 0.40 10*3/mm3    Basophils, Absolute 0.03 0.00 - 0.20 10*3/mm3    Immature Grans, Absolute 0.02 0.00 - 0.05 10*3/mm3    nRBC 0.0 0.0 - 0.2 /100 WBC        Result Review :     Assessment & Plan     Hiccups unknown etiology     Discussion with patient.  I reviewed his labs and imaging with results mentioned above.  Prescription given for Flexeril to try to help with symptoms.  I recommended upper endoscopy for further evaluation and biopsies.  Benefits and alternatives discussed.  Risk procedure including bleeding perforation discussed.  All questions answered.  He agrees with the plan.  Recommend further evaluation by GI for work-up of hiccups.  Thank for the consult        This document has been electronically signed by Fausto Madrid MD  March 7, 2023 16:21 EST

## 2023-03-08 ENCOUNTER — TELEPHONE (OUTPATIENT)
Dept: GASTROENTEROLOGY | Facility: CLINIC | Age: 43
End: 2023-03-08
Payer: COMMERCIAL

## 2023-03-08 ENCOUNTER — TELEPHONE (OUTPATIENT)
Dept: SURGERY | Facility: CLINIC | Age: 43
End: 2023-03-08
Payer: COMMERCIAL

## 2023-03-08 NOTE — TELEPHONE ENCOUNTER
Called and spoke with patient per Dr. Madrid to make sure he was going to continue care with Dr. Bowles. Patient stated he would be under the care of Dr. Bowles and had no further questions or concerns for our office.

## 2023-03-08 NOTE — TELEPHONE ENCOUNTER
----- Message from SHARONDA Peters sent at 3/7/2023  8:49 AM EST -----  I have reviewed upper endoscopy. Negative results for H. Pylori, metaplasia, dysplasia and malignancy.

## 2023-03-08 NOTE — TELEPHONE ENCOUNTER
I spoke with Mr Andrew, notified of results. Encouraged to keep f/u apt. Voiced understanding. marcus

## 2023-03-20 ENCOUNTER — TELEPHONE (OUTPATIENT)
Dept: GASTROENTEROLOGY | Facility: CLINIC | Age: 43
End: 2023-03-20
Payer: COMMERCIAL

## 2023-03-20 NOTE — TELEPHONE ENCOUNTER
Mr orta called stating he is still having hiccups, after eating/drinking..    He had a recent EGD with dr cheema.  I asked if he is still taking the Rx's charley whitaker np sent in.. he stated he is only taking Protonix.    Per egd report, continue protinix BID, gas x and baclofen.    He stated he did not  baclofen from pharmacy yet. He has gas x at home.    Advised to take as prescribed for relief. Advised to keep f/u appt.  Voiced understanding.  marcus

## 2023-03-30 RX ORDER — SIMETHICONE 80 MG/1
TABLET, CHEWABLE ORAL
Qty: 56 TABLET | Refills: 0 | Status: SHIPPED | OUTPATIENT
Start: 2023-03-30

## 2023-04-03 ENCOUNTER — TELEPHONE (OUTPATIENT)
Dept: FAMILY MEDICINE CLINIC | Age: 43
End: 2023-04-03
Payer: COMMERCIAL

## 2023-04-03 NOTE — TELEPHONE ENCOUNTER
Please clarify with the patient what this is for.  He was recently kept off work related to an issue with his stomach and hiccups.  However, that issue has been resolved I think.  Is a something that we have done before for some other diagnosis?  Thanks.

## 2023-04-03 NOTE — TELEPHONE ENCOUNTER
Pt states that it is for hiccups. It has improved but he still has some days where it flares up. He has not missed any days yet but would like FMLA just in case.

## 2023-04-03 NOTE — TELEPHONE ENCOUNTER
4/3/23 PT DROPPED OFF [facility] PAPERWORK. COLLECTED $20 FEE. LET PT KNOW WE LEGALLY HAVE 14 DAYS TO COMPLETE IT. SENT TO J[facility]I AND PUT IN HER BOX. ULISES

## 2023-04-05 NOTE — TELEPHONE ENCOUNTER
Copies placed in scan basket and pt drawer. Called pt to let him know that it is ready for  - voicemail not set up.

## 2023-04-17 NOTE — PROGRESS NOTES
Chief Complaint  EGD follow up     Latrell Morales is a 42 y.o. male who presents to [facility] [place] for EGD follow up     History of present Illness  Patient presents to the office for EGD follow up with a history of intractable hiccups, GERD, and hiatal hernia. After EGD patient was referred to sleep medicine to evaluated for ERNESTINE, appointment 5/19.  Hiccups have greatly improved with Protonix 40 mg twice daily and baclofen 3 times daily, patient does not believe he is taking Reglan.  He has hiccups about 1 time a day that resolved after breath-holding.  He has known food triggers that he tries to avoid.  Since episode first began he has noticed early satiety.  He is unable to eat same quantity of food.  Denies nausea, vomiting, epigastric pain, and dysphagia.  Denies lower GI symptoms.    EGD 03/06/2023 by Dr. Bowles - 4cm hiatal hernia, LA grade A esophagitis, normal stomach and duodenum. Stomach and esophageal biopsies normal.     CT abdomen pelvis with contrast 02/24/2023 -moderate hiatal hernia, mild esophageal wall thickening   CT chest with contrast 2/24/2023 -moderate size hiatal hernia, mild esophagus thickening    Past Medical History:   Diagnosis Date   • Abnormal electrocardiogram    • Abnormal level of hormones in specimens from other organs, systems and tissues    • High risk sexual behavior    • Insomnia    • Nontoxic goiter    • Other specified anxiety disorders        Past Surgical History:   Procedure Laterality Date   • ENDOSCOPY N/A 03/06/2023    Procedure: ESOPHAGOGASTRODUODENOSCOPY WITH BIOPSIES;  Surgeon: Franko Bowles MD;  Location: [facility] ENDOSCOPY;  Service: Gastroenterology;  Laterality: N/A;  SMALL HIATAL HERNIA, ESOPHAGITIS   • KNEE SURGERY Left     xfive- titanium plate and screws   • UPPER GASTROINTESTINAL ENDOSCOPY     • VASECTOMY           Current Outpatient Medications:   •  baclofen (LIORESAL) 10 MG tablet, Take 1 tablet by mouth 3  "(Three) Times a Day., Disp: 90 tablet, Rfl: 0  •  HM Gas Relief 80 MG chewable tablet, CHEW ONE TABLET BY MOUTH EVERY 6 HOURS FOR FOURTEEN DAYS, Disp: 56 tablet, Rfl: 0  •  metoclopramide (REGLAN) 10 MG tablet, metoclopramide 10 mg tablet  TAKE ONE TABLET BY MOUTH THREE TIMES DAILY FOR FOURTEEN DAYS (Patient not taking: Reported on 4/18/2023), Disp: , Rfl:   •  pantoprazole (Protonix) 40 MG EC tablet, Take 1 tablet by mouth Daily for 90 days., Disp: 90 tablet, Rfl: 1  •  sildenafil (Viagra) 100 MG tablet, Take 1/2 to 1 tablet an hour prior to sex (Patient not taking: Reported on 4/18/2023), Disp: 30 tablet, Rfl: 2     Allergies   Allergen Reactions   • Adhesive Tape Hives and Rash   • Latex Rash   • Sertraline Hcl Other (See Comments)     Erectile dysfunction       Family History   Problem Relation Age of Onset   • No Known Problems Mother    • Suicidality Father         cause of death   • No Known Problems Sister    • Coronary artery disease Maternal Grandmother    • Heart failure Maternal Grandfather         cause of death   • Heart attack Paternal Grandfather         cause of death   • Colon cancer Neg Hx    • Malig Hyperthermia Neg Hx         Social History     Social History Narrative   • Not on file       Objective       Vital Signs:   /80 (BP Location: Left arm, Patient Position: Sitting, Cuff Size: Adult)   Pulse 102   Ht 175.3 cm (69.02\")   Wt 101 kg (222 lb 9.6 oz)   SpO2 97%   BMI 32.86 kg/m²       Physical Exam  Constitutional:       Appearance: Normal appearance.   HENT:      Head: Normocephalic.   Cardiovascular:      Rate and Rhythm: Normal rate and regular rhythm.      Heart sounds: Normal heart sounds.   Pulmonary:      Effort: Pulmonary effort is normal.      Breath sounds: Normal breath sounds.   Abdominal:      General: Bowel sounds are normal.      Palpations: Abdomen is soft.   Skin:     General: Skin is warm and dry.   Neurological:      Mental Status: He is alert and oriented to " person, place, and time. Mental status is at baseline.   Psychiatric:         Mood and Affect: Mood normal.         Behavior: Behavior normal.         Thought Content: Thought content normal.         Judgment: Judgment normal.         Result Review :       CBC w/diff        5/2/2022    08:46 2/23/2023    15:15   CBC w/Diff   WBC 5.87   5.32     RBC 5.26   4.97     Hemoglobin 16.2   14.5     Hematocrit 48.7   45.6     MCV 92.6   91.8     MCH 30.8   29.2     MCHC 33.3   31.8     RDW 13.2   12.7     Platelets 200   229     Neutrophil Rel %  59.1     Immature Granulocyte Rel %  0.4     Lymphocyte Rel %  28.6     Monocyte Rel %  8.3     Eosinophil Rel %  3.0     Basophil Rel %  0.6       CMP        5/2/2022    08:46 2/23/2023    15:15   CMP   Glucose 66   88     BUN 18   16     Creatinine 1.32   1.03     EGFR 69.5   93.0     Sodium 141   136     Potassium 4.7   4.6     Chloride 106   103     Calcium 8.9   8.5     Total Protein 6.2   6.4     Albumin 4.00   3.6     Globulin 2.2   2.8     Total Bilirubin 0.4   0.4     Alkaline Phosphatase 40   29     AST (SGOT) 27   17     ALT (SGPT) 33   23     Albumin/Globulin Ratio 1.8   1.3     BUN/Creatinine Ratio 13.6   15.5     Anion Gap 9.5   7.7               Assessment and Plan    Diagnoses and all orders for this visit:    1. Hiccups (Primary)    2. Hiatal hernia    3. Esophagitis    Other orders  -     pantoprazole (Protonix) 40 MG EC tablet; Take 1 tablet by mouth Daily for 90 days.  Dispense: 90 tablet; Refill: 1    42 year old patient presents to the office for EGD follow up with a history of intractable hiccups, GERD, and hiatal hernia. After EGD patient was referred to sleep medicine to evaluated for ERNESTINE, appointment 5/19.  Reviewed most recent EGD report and pathology with the patient.  Hiccups have greatly improved with Protonix 40 mg twice daily and baclofen 3 times daily, patient does not believe he is taking Reglan.  He has hiccups about 1 time a day that resolved  after breath-holding. He will continue Protonix BID dosing for the next 3 weeks since this has improved symptoms then decrease to once daily.  Plan to follow-up in 3 months to ensure hiccups continue to improve.  Patient is agreeable to plan will call the office any questions or concerns.    Follow Up   No follow-ups on file.  Patient was given instructions and counseling regarding his condition or for health maintenance advice. Please see specific information pulled into the AVS if appropriate.

## 2023-04-18 ENCOUNTER — OFFICE VISIT (OUTPATIENT)
Dept: GASTROENTEROLOGY | Facility: CLINIC | Age: 43
End: 2023-04-18
Payer: COMMERCIAL

## 2023-04-18 VITALS
OXYGEN SATURATION: 97 % | HEART RATE: 102 BPM | BODY MASS INDEX: 32.97 KG/M2 | DIASTOLIC BLOOD PRESSURE: 80 MMHG | WEIGHT: 222.6 LBS | HEIGHT: 69 IN | SYSTOLIC BLOOD PRESSURE: 134 MMHG

## 2023-04-18 DIAGNOSIS — K20.90 ESOPHAGITIS: ICD-10-CM

## 2023-04-18 DIAGNOSIS — R06.6 HICCUPS: Primary | ICD-10-CM

## 2023-04-18 DIAGNOSIS — K44.9 HIATAL HERNIA: ICD-10-CM

## 2023-04-18 RX ORDER — METOCLOPRAMIDE 10 MG/1
TABLET ORAL
COMMUNITY

## 2023-04-18 RX ORDER — PANTOPRAZOLE SODIUM 40 MG/1
40 TABLET, DELAYED RELEASE ORAL DAILY
Qty: 90 TABLET | Refills: 1 | Status: SHIPPED | OUTPATIENT
Start: 2023-04-18 | End: 2023-07-17

## 2023-05-08 ENCOUNTER — TELEPHONE (OUTPATIENT)
Dept: GASTROENTEROLOGY | Facility: CLINIC | Age: 43
End: 2023-05-08
Payer: COMMERCIAL

## 2023-05-08 RX ORDER — BACLOFEN 10 MG/1
10 TABLET ORAL 3 TIMES DAILY
Qty: 90 TABLET | Refills: 0 | Status: SHIPPED | OUTPATIENT
Start: 2023-05-08

## 2023-05-08 RX ORDER — METOCLOPRAMIDE 10 MG/1
10 TABLET ORAL 3 TIMES DAILY
Qty: 60 TABLET | Refills: 0 | Status: SHIPPED | OUTPATIENT
Start: 2023-05-08

## 2023-05-08 RX ORDER — PANTOPRAZOLE SODIUM 40 MG/1
40 TABLET, DELAYED RELEASE ORAL DAILY
Qty: 90 TABLET | Refills: 1 | Status: SHIPPED | OUTPATIENT
Start: 2023-05-08 | End: 2023-08-06

## 2023-05-08 RX ORDER — SIMETHICONE 80 MG
80 TABLET,CHEWABLE ORAL EVERY 6 HOURS PRN
Qty: 56 TABLET | Refills: 0 | Status: SHIPPED | OUTPATIENT
Start: 2023-05-08

## 2023-05-08 NOTE — TELEPHONE ENCOUNTER
Spoke with Criselda MCDONOUGH on hiccup concerns. She wants pt to continue Protonix 2 times daily, baclofen as prescribed, and Reglan has been resent to pharmacy wants pt to take this for 14 days 3 times a day. Will call pt and inform medication has been sent to pharmacy. Pt states the hiccups occures after he has a pain shot. Take chewable gas-x

## 2023-05-19 ENCOUNTER — OFFICE VISIT (OUTPATIENT)
Dept: SLEEP MEDICINE | Facility: HOSPITAL | Age: 43
End: 2023-05-19
Payer: COMMERCIAL

## 2023-05-19 VITALS
BODY MASS INDEX: 32.39 KG/M2 | SYSTOLIC BLOOD PRESSURE: 150 MMHG | OXYGEN SATURATION: 97 % | HEIGHT: 69 IN | HEART RATE: 101 BPM | DIASTOLIC BLOOD PRESSURE: 85 MMHG | WEIGHT: 218.7 LBS

## 2023-05-19 DIAGNOSIS — G47.33 OSA (OBSTRUCTIVE SLEEP APNEA): Primary | ICD-10-CM

## 2023-05-19 PROCEDURE — G0463 HOSPITAL OUTPT CLINIC VISIT: HCPCS

## 2023-05-19 RX ORDER — HYDROCODONE BITARTRATE AND ACETAMINOPHEN 5; 325 MG/1; MG/1
1 TABLET ORAL AS NEEDED
COMMUNITY
Start: 2023-04-24

## 2023-05-19 NOTE — PROGRESS NOTES
[facility]      Patient Care Team:  Raffi Pierson MD as PCP - General (Family Medicine)    Referring Provider: Franko Bowles, *    Chief complaint:   Stop breathing at night    History of present illness:    Subjective   This is a 42 year old male patient with hx of hiatal hernia and reflux.     He reported issues with loud snoring for years which disturbs his girlfriend and sometimes awakens him form sleep. His partner has watches him stopping breathing at night and she sometimes awakens him to make sure he breathes.     He also reported waking up with a dry mouth.     Sleep schedule:  -Bedtime: 7 AM  -Sleep latency: 30 min  -Wake up time: 12 noon to 1 PM , does not feel refreshed  -Nocturnal awakenin times because of nocturia.  No difficulties going back to sleep.  -Perceived sleep hours: 5 hours      ESS: Total score: 16     Review of Systems  Constitutional: No fever or chills. No changes in appetite. Fatigue.  ENMT: No sinus congestion, postnasal drip, hoarsness  Cardiovascular: No chest pain, palpitation or legs swelling.    Respiratory: No dyspnea, cough or wheezing.  Gastrointestinal: No constipation, diarrhea, abdominal pain but acid reflux  Neurology: No headache, weakness, numbness or dizziness.   Musculoskeletal: No joints pain, stiffness or swelling.   Psychiatry: No depression, anxiety or irritability.   Hem/Lymphatic: No swollen glands or easy bruising.  Integumentary: No rash.  Endocrinology: No excessive thirst, cold or warm intolerance.   Urinary: No dysuria, bloody urine or frequent urination.     History  Past Medical History:   Diagnosis Date   • Abnormal electrocardiogram    • Abnormal level of hormones in specimens from other organs, systems and tissues    • High risk sexual behavior    • Insomnia    • Nontoxic goiter    • Other specified anxiety disorders    ,   Past Surgical History:   Procedure Laterality  Date   • ENDOSCOPY N/A 03/06/2023    Procedure: ESOPHAGOGASTRODUODENOSCOPY WITH BIOPSIES;  Surgeon: Franko Bowles MD;  Location: [facility] ENDOSCOPY;  Service: Gastroenterology;  Laterality: N/A;  SMALL HIATAL HERNIA, ESOPHAGITIS   • KNEE SURGERY Left     xfive- titanium plate and screws   • UPPER GASTROINTESTINAL ENDOSCOPY     • VASECTOMY     ,   Family History   Problem Relation Age of Onset   • No Known Problems Mother    • Suicidality Father         cause of death   • No Known Problems Sister    • Coronary artery disease Maternal Grandmother    • Heart failure Maternal Grandfather         cause of death   • Heart attack Paternal Grandfather         cause of death   • Colon cancer Neg Hx    • Malig Hyperthermia Neg Hx    ,   Social History     Socioeconomic History   • Marital status:    • Number of children: 2   Tobacco Use   • Smoking status: Never     Passive exposure: Past   • Smokeless tobacco: Never   Vaping Use   • Vaping Use: Never used   Substance and Sexual Activity   • Alcohol use: Yes     Comment: rare   • Drug use: Never   • Sexual activity: Yes     E-cigarette/Vaping   • E-cigarette/Vaping Use Never User      E-cigarette/Vaping Substances   • Nicotine No    • THC No    • CBD No    • Flavoring No      E-cigarette/Vaping Devices   • Disposable No    • Pre-filled or Refillable Cartridge No    • Refillable Tank No    • Pre-filled Pod No         and Allergies:  Adhesive tape, Latex, and Sertraline hcl    Medications:    Current Outpatient Medications:   •  baclofen (LIORESAL) 10 MG tablet, Take 1 tablet by mouth 3 (Three) Times a Day., Disp: 90 tablet, Rfl: 0  •  HYDROcodone-acetaminophen (NORCO) 5-325 MG per tablet, Take 1 tablet by mouth As Needed., Disp: , Rfl:   •  metoclopramide (REGLAN) 10 MG tablet, Take 1 tablet by mouth 3 (Three) Times a Day., Disp: 60 tablet, Rfl: 0  •  pantoprazole (Protonix) 40 MG EC tablet, Take 1 tablet by mouth Daily for 90 days., Disp: 90 tablet, Rfl: 1  •   "sildenafil (Viagra) 100 MG tablet, Take 1/2 to 1 tablet an hour prior to sex, Disp: 30 tablet, Rfl: 2  •  simethicone (HM Gas Relief) 80 MG chewable tablet, Chew 1 tablet Every 6 (Six) Hours As Needed for Flatulence., Disp: 56 tablet, Rfl: 0      Objective   Vital Signs:  Vitals:    05/19/23 1300   BP: 150/85   Pulse: 101   SpO2: 97%   Weight: 99.2 kg (218 lb 11.2 oz)   Height: 175.3 cm (69.02\")     Body mass index is 32.28 kg/m².  Neck Circumference: 16 inches     Physical Exam:  Neck Circumference: 16 inches     Constitutional: Not in acute distress.  Eyes: Injected conjunctiva, EOMI. pupils equal reactive to light.  ENMT: Gonzalez score 4. Mallampati score 4. No oral thrush. Tonsils grade 1. Narrow distance in between the posterior pharyngeal pillars. Scalloped tongue.  Neck: Large. No thyromegaly.  Trachea midline.  Heart: Regular rhythm and rate, no murmur  Lungs: Good and equal air entry bilaterally. No crackles or wheezing.  Nonlabored breathing.       Abdomen: Obese.  Soft.  No tenderness.  Positive bowel sounds.  Extremities: No cyanosis, clubbing or pitting edema.  Warm extremities and well-perfused.  Neuro: Conscious, alert, oriented x3.  Gait is normal.  Strength 5/5 in arms and legs.  Psych: Appropriate mood and affect.    Integumentary: No rash.  Normal skin turgor.  Lymphatic: No palpable cervical or supraclavicular lymph nodes.    Diagnostic data:  No results found for: HGBA1C  Total Cholesterol   Date Value Ref Range Status   05/02/2022 108 0 - 200 mg/dL Final     Triglycerides   Date Value Ref Range Status   05/02/2022 62 0 - 150 mg/dL Final     HDL Cholesterol   Date Value Ref Range Status   05/02/2022 39 (L) 40 - 60 mg/dL Final     Hemoglobin   Date Value Ref Range Status   02/23/2023 14.5 13.0 - 17.7 g/dL Final   10/31/2019 17.4 13.5 - 17.5 g/dL Final     CO2   Date Value Ref Range Status   02/23/2023 25.3 22.0 - 29.0 mmol/L Final     Total CO2   Date Value Ref Range Status   10/31/2019 29 22 - " 30 mmol/L Final       Assessment   1. Snoring  2. Obesity BMI 32  3. Hypersomnia  4. GERD      Plan:  Check HST. We discussed the pathophysiology of sleep apnea, testing and therapy which include CPAP and weight loss.  Patient is agreeable with CPAP therapy if needed.    Counseled for weight loss.  Encouraged to exercise regularly and cut down on carbohydrates.  Discussed that losing weight may decrease the severity of sleep apnea and obviate the need of CPAP therapy.    Counseled against driving or operating of machinery when sleepy.        Alycia Clifton MD  05/19/23  15:06 EDT    This note was dictated utilizing Dragon dictation

## 2023-05-31 ENCOUNTER — HOSPITAL ENCOUNTER (OUTPATIENT)
Dept: SLEEP MEDICINE | Facility: HOSPITAL | Age: 43
End: 2023-05-31
Payer: COMMERCIAL

## 2023-05-31 DIAGNOSIS — G47.33 OSA (OBSTRUCTIVE SLEEP APNEA): ICD-10-CM

## 2023-05-31 PROCEDURE — 95806 SLEEP STUDY UNATT&RESP EFFT: CPT

## 2023-06-02 DIAGNOSIS — G47.33 OSA (OBSTRUCTIVE SLEEP APNEA): Primary | ICD-10-CM

## 2023-06-06 ENCOUNTER — TELEPHONE (OUTPATIENT)
Dept: SLEEP MEDICINE | Facility: HOSPITAL | Age: 43
End: 2023-06-06
Payer: COMMERCIAL

## 2023-06-06 RX ORDER — BACLOFEN 10 MG/1
TABLET ORAL
Qty: 90 TABLET | Refills: 0 | Status: SHIPPED | OUTPATIENT
Start: 2023-06-06

## 2023-06-06 RX ORDER — METOCLOPRAMIDE 10 MG/1
TABLET ORAL
Qty: 60 TABLET | Refills: 0 | Status: SHIPPED | OUTPATIENT
Start: 2023-06-06

## 2023-06-19 ENCOUNTER — TELEPHONE (OUTPATIENT)
Dept: SLEEP MEDICINE | Facility: HOSPITAL | Age: 43
End: 2023-06-19
Payer: COMMERCIAL

## 2023-07-17 ENCOUNTER — TELEPHONE (OUTPATIENT)
Dept: FAMILY MEDICINE CLINIC | Age: 43
End: 2023-07-17

## 2023-07-17 NOTE — TELEPHONE ENCOUNTER
I spoke with Latrell just now.  He is having a recurrence of these intractable hiccups.  It is making it difficult for him to sleep.  He is also having difficulty functioning at work and making it through a normal shift.  Because of this, I am recommending he be off work starting now with a tentative return to work on 8/1/2023.  I will forward this to our staff so that he get a work note or other excuse as needed.  He does have a couple of scheduled upcoming specialty appointments that he will keep.  Thanks.

## 2023-07-17 NOTE — TELEPHONE ENCOUNTER
Caller: Latrell Morales    Relationship: Self    Best call back number: [phone]     What is the best time to reach you: ANY OR AFTER 12 NOON BEST    Who are you requesting to speak with (clinical staff, provider,  specific staff member): CLINICAL    What was the call regarding: PATIENT STATED THAT HE IS NEEDING TO BE PUT OFF WORK AGAIN FOR CHROMIC HICCUPS AND THAT HE HAS HAD CHROMIC HICCUPS FOR THE PAST 9 DAYS. HE HAS A 7/20/23 APPOINTMENT TO SEE SURGEON ABOUT HIATAL HERNIA    Is it okay if the provider responds through MyChart: NO

## 2023-07-25 ENCOUNTER — OFFICE VISIT (OUTPATIENT)
Dept: GASTROENTEROLOGY | Facility: CLINIC | Age: 43
End: 2023-07-25
Payer: COMMERCIAL

## 2023-07-25 VITALS
OXYGEN SATURATION: 98 % | WEIGHT: 215 LBS | HEIGHT: 70 IN | HEART RATE: 55 BPM | SYSTOLIC BLOOD PRESSURE: 115 MMHG | BODY MASS INDEX: 30.78 KG/M2 | DIASTOLIC BLOOD PRESSURE: 82 MMHG

## 2023-07-25 DIAGNOSIS — R11.2 NAUSEA AND VOMITING, UNSPECIFIED VOMITING TYPE: ICD-10-CM

## 2023-07-25 DIAGNOSIS — K44.9 HIATAL HERNIA: Primary | ICD-10-CM

## 2023-07-25 DIAGNOSIS — R06.6 INTRACTABLE HICCUPS: ICD-10-CM

## 2023-07-25 PROCEDURE — 99214 OFFICE O/P EST MOD 30 MIN: CPT | Performed by: INTERNAL MEDICINE

## 2023-07-25 NOTE — PROGRESS NOTES
Chief Complaint    Latrell Morales is a 42 y.o. male who presents to [facility] for follow-up of chronic hiccups.  Patient has had CT scan of the abdomen pelvis, EGD, evaluation with thoracic surgery who ordered esophageal manometry and 24-hour pH testing which is scheduled in the near future.  He has been using Protonix 40 mg twice daily, baclofen 3 times daily and Reglan 3 times daily.  He still has episodes of hiccups that can last 14 to 21 days and with episodes of no hiccups for maybe 2 weeks at a time.  We did send him for a sleep study and he is scheduled to begin using CPAP next week.  He does not drink alcohol excessively.  He denies any other medications.  His thyroid has been normal.  Electrolytes have been unremarkable.    Result Review :     The following data was reviewed by: Franko Bowles MD on 07/25/2023:    CMP          2/23/2023    15:15   CMP   Glucose 88    BUN 16    Creatinine 1.03    EGFR 93.0    Sodium 136    Potassium 4.6    Chloride 103    Calcium 8.5    Total Protein 6.4    Albumin 3.6    Globulin 2.8    Total Bilirubin 0.4    Alkaline Phosphatase 29    AST (SGOT) 17    ALT (SGPT) 23    Albumin/Globulin Ratio 1.3    BUN/Creatinine Ratio 15.5    Anion Gap 7.7      CBC          2/23/2023    15:15   CBC   WBC 5.32    RBC 4.97    Hemoglobin 14.5    Hematocrit 45.6    MCV 91.8    MCH 29.2    MCHC 31.8    RDW 12.7    Platelets 229        Data reviewed : GI studies reviewed      Past Medical History:   Diagnosis Date    Abnormal electrocardiogram     Abnormal level of hormones in specimens from other organs, systems and tissues     High risk sexual behavior     Insomnia     Nontoxic goiter     Other specified anxiety disorders        Past Surgical History:   Procedure Laterality Date    ENDOSCOPY N/A 03/06/2023    Procedure: ESOPHAGOGASTRODUODENOSCOPY WITH BIOPSIES;  Surgeon: Franko Bowles MD;  Location: [facility] ENDOSCOPY;  Service: Gastroenterology;   "Laterality: N/A;  SMALL HIATAL HERNIA, ESOPHAGITIS    KNEE SURGERY Left     xfive- titanium plate and screws    UPPER GASTROINTESTINAL ENDOSCOPY      VASECTOMY         Social History     Social History Narrative    Not on file       Objective     Vital Signs:   /82 (BP Location: Left arm, Patient Position: Lying, Cuff Size: Adult)   Pulse 55   Ht 176.5 cm (69.5\")   Wt 97.5 kg (215 lb)   SpO2 98%   BMI 31.29 kg/m²     Body mass index is 31.29 kg/m².    Physical Exam            Assessment and Plan    Diagnoses and all orders for this visit:    1. Hiatal hernia (Primary)    2. Intractable hiccups    3. Nausea and vomiting, unspecified vomiting type      Patient has intractable hiccups which have been challenging.  He currently is having no symptoms but this only last for about 2 weeks and most then he has several weeks of refractory hiccups.  I will have him discontinue baclofen and Reglan when he is not having any symptoms.  He will also decrease his Protonix to once a day dosing.  I am hopeful that once he starts his CPAP usage for sleep apnea that his symptoms may improve in the next couple of months.  He will also continue with planned esophageal manometry/24-hour pH testing.  I would be very hesitant to proceed with hiatal hernia repair which seems to be in agreement with thoracic surgery.  He will follow-up with us in 3 to 4 months.  Other things to consider would be a trial of Neurontin or even Thorazine.            Follow Up   No follow-ups on file.  Patient was given instructions and counseling regarding his condition or for health maintenance advice. Please see specific information pulled into the AVS if appropriate.     "

## 2023-07-27 ENCOUNTER — TELEPHONE (OUTPATIENT)
Dept: FAMILY MEDICINE CLINIC | Age: 43
End: 2023-07-27
Payer: COMMERCIAL

## 2023-07-27 NOTE — TELEPHONE ENCOUNTER
Returned call to [facility], spoke to June - gave first date of treatment as telephone call on 7/17/23 and listed medication names.

## 2023-07-27 NOTE — TELEPHONE ENCOUNTER
Caller: [employer]    Relationship: Other    Best call back number: [phone]    What form or medical record are you requesting: SHORT TERM DISABILITY      Timeframe paperwork needed: DECISION NEEDS TO BE MADE BY END OF DAY TOMORROW     Additional notes: FORM WAS COMPLETED BY DR. MEDRANO ON 07/18/2023, AND THE FOLLOWING INFORMATION IS NEEDED   DATES PATIENT WAS TREATED ON OR AFTER 07/17/2023  ANY FUTURE VISITS SCHEDULED  ANY MEDICATIONS THAT WERE PRESCRIBED  PLEASE CALL WITH THIS INFORMATION AS SOON AS POSSIBLE

## 2023-07-27 NOTE — TELEPHONE ENCOUNTER
Caller: Latrell Morales    Relationship to patient: Self    Best call back number: [phone]     Patient is needing: PATIENT STATES THAT HE  SPOKE WITH DR. MEDRANO ON THE PHONE ON 7/17/23 AND THAT IS WHEN HE TOOK HIM OFF WORK.

## 2023-07-28 ENCOUNTER — TELEPHONE (OUTPATIENT)
Dept: FAMILY MEDICINE CLINIC | Age: 43
End: 2023-07-28
Payer: COMMERCIAL

## 2023-07-28 NOTE — TELEPHONE ENCOUNTER
Caller: TEENA    Relationship: Other    Best call back number: [phone]     What form or medical record are you requesting: NOTES FROM LAST OFFICE VISIT    Who is requesting this form or medical record from you: TEENA    How would you like to receive the form or medical records (pick-up, mail, fax):   CLAIM NUMBER: [id number]     [phone]    Timeframe paperwork needed: AS SOON AS POSSIBLE    Additional notes: CALLER STATES THEY DID GET THE INFORMATION REQUESTED YESTERDAY 7.27.2023, HOWEVER ARE NEEDING A COPY OF THE OFFICE NOTES FROM LAST VISIT OF THE PATIENT FAXED OVER

## 2023-07-31 ENCOUNTER — TELEPHONE (OUTPATIENT)
Dept: GASTROENTEROLOGY | Facility: CLINIC | Age: 43
End: 2023-07-31
Payer: COMMERCIAL

## 2023-08-02 DIAGNOSIS — N52.9 ERECTILE DYSFUNCTION, UNSPECIFIED ERECTILE DYSFUNCTION TYPE: ICD-10-CM

## 2023-08-02 RX ORDER — SILDENAFIL 100 MG/1
TABLET, FILM COATED ORAL
Qty: 30 TABLET | Refills: 2 | Status: SHIPPED | OUTPATIENT
Start: 2023-08-02

## 2023-08-22 ENCOUNTER — TELEPHONE (OUTPATIENT)
Dept: FAMILY MEDICINE CLINIC | Age: 43
End: 2023-08-22
Payer: COMMERCIAL

## 2023-08-22 NOTE — TELEPHONE ENCOUNTER
"Received call from answering service regarding patient at 4AM.  Spoke with patient.  He stated he is experiencing hiccups for about 2 hours.  This sounds like it is a chronic issue for patient as he stated he has seen GI (Dr. Bowles) and thoracic surgery (Dr. Novoa) for intractable hiccups and hiatal hernia.  Reviewed Dr. Bowles's most recent note from 7-.  He has been prescribed Protonix, Reglan, and baclofen for his intractable hiccups.  He is wondering if his hiccups are related to his \"pain shot\" that he received from [facility] at 9 AM on 8-.  He states that Dr. Bowles's office told him in the past it is not related to his \"pain shot.\"  He denies any other symptoms at this time including shortness of breath, chest pain, wheezing, hives.     At this time, I advised him to continue medications prescribed per GI for intractable hiccups. I advised him to contact Dr. Vanegas's office when they open to discuss this possibly being related to his \"pain shot.\" I also advised him to contact Dr. Bowles's office to see if they have any other recommendations.  Patient is also requesting an appointment with Dr. Pierson. I advised him we would contact him sometime after the office opens to schedule him an appointment.  In the meantime, I did advise him to proceed to the ER should he develop any new or worsening symptoms.  He voiced understanding.    Pod A: Please reach out to patient and schedule him an appointment with Dr. Pierson.  He is requesting to be seen today if possible.  "

## 2023-08-22 NOTE — TELEPHONE ENCOUNTER
I am happy to see him.  I have limited things that I can offer for the hiccups per se though.  It will be important that he follow-up with a specialist regarding this.  Thanks.

## 2023-09-01 ENCOUNTER — TELEPHONE (OUTPATIENT)
Dept: SURGERY | Facility: CLINIC | Age: 43
End: 2023-09-01
Payer: COMMERCIAL

## 2023-09-08 ENCOUNTER — OFFICE VISIT (OUTPATIENT)
Dept: SLEEP MEDICINE | Facility: HOSPITAL | Age: 43
End: 2023-09-08
Payer: COMMERCIAL

## 2023-09-08 VITALS
OXYGEN SATURATION: 97 % | WEIGHT: 219.9 LBS | DIASTOLIC BLOOD PRESSURE: 83 MMHG | HEART RATE: 71 BPM | HEIGHT: 69 IN | BODY MASS INDEX: 32.57 KG/M2 | SYSTOLIC BLOOD PRESSURE: 129 MMHG

## 2023-09-08 DIAGNOSIS — G47.33 OSA (OBSTRUCTIVE SLEEP APNEA): Primary | ICD-10-CM

## 2023-09-08 PROCEDURE — G0463 HOSPITAL OUTPT CLINIC VISIT: HCPCS

## 2023-09-08 NOTE — PROGRESS NOTES
[facility]                          Chief Complaint:   F/up ERNESTINE    History of present illness:   Subjective     42-year-old male patient with history of hiatal hernia and GERD who complains of loud snoring, apnea, frequent awakening and EDS.     ERNESTINE:  HST 6/1/2023: CORBY: 54 /h; Supine CORBY: 87/h; AUGUSTA=53.8 /h; Marquise SpO2=84 % and hypoxic burden: 7.3 min.    Mask: NP which does fit well.  No significant air leak or dry mouth  DME: [facility]    Hypersomnia:  Sleep schedule:  -Bedtime: 7 AM  -Sleep latency: Not long  -Wake up time: 12 PM , does feel refreshed  -Nocturnal awakening: None     ESS: Total score: 5 <-- 16    REVIEW OF SYSTEMS:   HEENT: No nasal congestion or postnasal drip   CARDIOVASCULAR: No chest pain, chest pressure or chest discomfort. No palpitations or edema.   RESPIRATORY: No shortness of breath, cough or sputum.   GASTROINTESTINAL: No abdominal bloating or reflux   NEUROLOGICAL/PSYCHOATRY: No depression nor anxiety    Past Medical History:  Past Medical History:   Diagnosis Date    Abnormal electrocardiogram     Abnormal level of hormones in specimens from other organs, systems and tissues     High risk sexual behavior     Insomnia     Nontoxic goiter     Other specified anxiety disorders    ,   Past Surgical History:   Procedure Laterality Date    ENDOSCOPY N/A 03/06/2023    Procedure: ESOPHAGOGASTRODUODENOSCOPY WITH BIOPSIES;  Surgeon: Franko Bowles MD;  Location: [facility] ENDOSCOPY;  Service: Gastroenterology;  Laterality: N/A;  SMALL HIATAL HERNIA, ESOPHAGITIS    KNEE SURGERY Left     xfive- titanium plate and screws    UPPER GASTROINTESTINAL ENDOSCOPY      VASECTOMY     ,   Social History     Socioeconomic History    Marital status:     Number of children: 2   Tobacco Use    Smoking status: Never     Passive exposure: Past    Smokeless tobacco: Never   Vaping Use    Vaping Use: Never used   Substance and Sexual Activity    Alcohol use: Yes  "    Comment: rare    Drug use: Never    Sexual activity: Yes     E-cigarette/Vaping    E-cigarette/Vaping Use Never User      E-cigarette/Vaping Substances    Nicotine No     THC No     CBD No     Flavoring No      E-cigarette/Vaping Devices    Disposable No     Pre-filled or Refillable Cartridge No     Refillable Tank No     Pre-filled Pod No          , and Allergies:  Adhesive tape, Latex, and Sertraline hcl    Medication Review:     Current Outpatient Medications:     baclofen (LIORESAL) 10 MG tablet, TAKE ONE TABLET BY MOUTH THREE TIMES DAILY, Disp: 90 tablet, Rfl: 0    HYDROcodone-acetaminophen (NORCO) 5-325 MG per tablet, Take 1 tablet by mouth As Needed., Disp: , Rfl:     ibuprofen (ADVIL,MOTRIN) 800 MG tablet, Take 1 tablet by mouth Every 8 (Eight) Hours As Needed., Disp: , Rfl:     metoclopramide (REGLAN) 10 MG tablet, TAKE ONE TABLET BY MOUTH THREE TIMES DAILY, Disp: 60 tablet, Rfl: 0    sildenafil (VIAGRA) 100 MG tablet, TAKE 1/2 TO 1 TABLET BY MOUTH ONE HOUR PRIOR TO SEX, Disp: 30 tablet, Rfl: 2    simethicone (HM Gas Relief) 80 MG chewable tablet, Chew 1 tablet Every 6 (Six) Hours As Needed for Flatulence., Disp: 56 tablet, Rfl: 0      Objective   Vital Signs:  Vitals:    09/08/23 1300   BP: 129/83   Pulse: 71   SpO2: 97%   Weight: 99.7 kg (219 lb 14.4 oz)   Height: 175.3 cm (69.02\")     Body mass index is 32.46 kg/m².          Physical Exam:   General Appearance:    Alert, cooperative, in no acute distress   ENMT:  Freidman score 4, narrow distance in between the posterior pharyngeal pillars . Scalloped tongue.   Neck:  Large. Trachea midline. No thyromegaly.   Lungs:     Clear to auscultation,respirations regular, even and  unlabored    Heart:    Regular rhythm and normal rate, normal S1 and S2, no Murmur.   Abdomen:     Obese.  Soft.  No tenderness.  No HSM    Neuro:   Conscious, alert, oriented x3. Appropriate mood and affect.    Extremities:   Moves all extremities well, no edema, no cyanosis, no " Redness              Diagnostic data:  CPAP download showed:  Date: Last 30 days  Usage (days): 97 %  Days used>4h: 80 %  AHI: 0.9/h  Leak: 2.9   Usage: 5h and 20 min  P 95% : 13  Auto CPAP: 6-18 cm H2O    No results found for: HGBA1C  Total Cholesterol   Date Value Ref Range Status   05/02/2022 108 0 - 200 mg/dL Final     Triglycerides   Date Value Ref Range Status   05/02/2022 62 0 - 150 mg/dL Final     HDL Cholesterol   Date Value Ref Range Status   05/02/2022 39 (L) 40 - 60 mg/dL Final     Hemoglobin   Date Value Ref Range Status   02/23/2023 14.5 13.0 - 17.7 g/dL Final   10/31/2019 17.4 13.5 - 17.5 g/dL Final     CO2   Date Value Ref Range Status   02/23/2023 25.3 22.0 - 29.0 mmol/L Final     Total CO2   Date Value Ref Range Status   10/31/2019 29 22 - 30 mmol/L Final        Assessment   ERNESTINE  Obesity, BMI 32  Hypersomnia      PLAN:    Discussed the result of the download.   Patient is compliant with therapy and clinically benefit from treatment.  Patient was encouraged to continue using PAP.  Refill supplies.     Increase sleep time to 7-8 hours at least.     Counseled for weight loss.  Encouraged to exercise regularly and cut down on carbohydrates.  Discussed that losing weight may decrease the severity of sleep apnea and obviate the need of CPAP therapy.                This note was dictated utilizing Dragon dictation

## 2023-10-02 ENCOUNTER — OFFICE VISIT (OUTPATIENT)
Dept: SURGERY | Facility: CLINIC | Age: 43
End: 2023-10-02
Payer: COMMERCIAL

## 2023-10-02 VITALS
RESPIRATION RATE: 16 BRPM | BODY MASS INDEX: 32.47 KG/M2 | DIASTOLIC BLOOD PRESSURE: 78 MMHG | HEART RATE: 74 BPM | WEIGHT: 220 LBS | SYSTOLIC BLOOD PRESSURE: 126 MMHG | OXYGEN SATURATION: 96 %

## 2023-10-02 DIAGNOSIS — K21.9 GASTROESOPHAGEAL REFLUX DISEASE WITHOUT ESOPHAGITIS: Primary | ICD-10-CM

## 2023-10-15 NOTE — PROGRESS NOTES
[facility] CONSULT NOTE    REASON FOR CONSULT: Intractable hiccups, gastroesophageal reflux disease and hiatal hernia    REFERRING PROVIDER: Raffi Pierson MD    Subjective   HISTORY OF PRESENTING ILLNESS:   Latrell Morales is a 43 y.o. male who has significant medical problems as mentioned in the medical chart.     He has constant hiccups and a hiatal hernia, and was referred here by his gastroenterologist. He has had this problem since early 2022 he has hiccups and belching and sometimes his chest wall gets sore and he has difficulty breathing.  He reported that his muscles in his chest lock up and he gasps for air. The pain is right in the center of his chest. Eventually it releases on its own. Sometimes he vomits afterward. He has hiccups about every minute, but sometimes they are more frequent, every second, one right after another. At one point he had them for 21 days in a row. He is currently on the 13th day of hiccups. He has had reflux for a couple of years, but not lately. He is taking Protonix 40 mg twice a day which helps with the reflux but not the hiccups. He has not had any chest or abdominal surgery. He did have upper endoscopy. He lifts weights 6 days a week. He has lost 25 pounds unintentionally. He is barely eating 2 meals a day. He has no appetite. His food goes down okay and he is able to swallow, but it causes flares of hiccups. He is apprehensive about eating and drinking. He has tried Reglan and Phenergan.     At the time of initial consultation, I recommended Bravo pH and esophageal manometry which was done [address] [place] [address]. The results were suggestive of pathological reflux.  He had normal UES.  The peristalsis initiated normally but failed to progress adequately in any swallow.  There was smooth muscle contraction segment which initiated normally but with reduced wider.  The LES was hypertensive at rest and relaxes and post follow-up. The findings were suggestive  of hypertensive LES atelectasis adequately.  There was ineffective esophageal motility and a partial wrap was recommended if needed.    He came to clinic for follow-up visit.  He reported that he had almost complete resolution of the hiccups.  He found that the hiccups were associated with steroid injections in the back for pain.  Since he has stopped taking those injection, he had no episode of hiccups.  He denies reflux symptoms and is not taking medication for reflux.    Past Medical History:   Diagnosis Date    Abnormal electrocardiogram     Abnormal level of hormones in specimens from other organs, systems and tissues     High risk sexual behavior     Insomnia     Nontoxic goiter     Other specified anxiety disorders        Past Surgical History:   Procedure Laterality Date    ENDOSCOPY N/A 03/06/2023    Procedure: ESOPHAGOGASTRODUODENOSCOPY WITH BIOPSIES;  Surgeon: Franko Bowles MD;  Location: [facility] ENDOSCOPY;  Service: Gastroenterology;  Laterality: N/A;  SMALL HIATAL HERNIA, ESOPHAGITIS    KNEE SURGERY Left     xfive- titanium plate and screws    UPPER GASTROINTESTINAL ENDOSCOPY      VASECTOMY         Family History   Problem Relation Age of Onset    No Known Problems Mother     Suicidality Father         cause of death    No Known Problems Sister     Coronary artery disease Maternal Grandmother     Heart failure Maternal Grandfather         cause of death    Heart attack Paternal Grandfather         cause of death    Colon cancer Neg Hx     Malig Hyperthermia Neg Hx        Social History     Socioeconomic History    Marital status:     Number of children: 2   Tobacco Use    Smoking status: Never     Passive exposure: Past    Smokeless tobacco: Never   Vaping Use    Vaping Use: Never used   Substance and Sexual Activity    Alcohol use: Yes     Comment: rare    Drug use: Never    Sexual activity: Yes         Current Outpatient Medications:     HYDROcodone-acetaminophen (NORCO) 5-325 MG per  "tablet, Take 1 tablet by mouth As Needed., Disp: , Rfl:     ibuprofen (ADVIL,MOTRIN) 800 MG tablet, Take 1 tablet by mouth Every 8 (Eight) Hours As Needed., Disp: , Rfl:     sildenafil (VIAGRA) 100 MG tablet, TAKE 1/2 TO 1 TABLET BY MOUTH ONE HOUR PRIOR TO SEX, Disp: 30 tablet, Rfl: 2    simethicone (HM Gas Relief) 80 MG chewable tablet, Chew 1 tablet Every 6 (Six) Hours As Needed for Flatulence., Disp: 56 tablet, Rfl: 0    baclofen (LIORESAL) 10 MG tablet, TAKE ONE TABLET BY MOUTH THREE TIMES DAILY (Patient not taking: Reported on 10/2/2023), Disp: 90 tablet, Rfl: 0    metoclopramide (REGLAN) 10 MG tablet, TAKE ONE TABLET BY MOUTH THREE TIMES DAILY (Patient not taking: Reported on 10/2/2023), Disp: 60 tablet, Rfl: 0     Allergies   Allergen Reactions    Adhesive Tape Hives and Rash    Latex Rash    Sertraline Hcl Other (See Comments)     Erectile dysfunction             Objective    OBJECTIVE:     VITAL SIGNS:  /78 (BP Location: Right arm, Patient Position: Sitting, Cuff Size: Large Adult)   Pulse 74   Resp 16   Ht (P) 175.3 cm (69.02\")   Wt 99.8 kg (220 lb)   SpO2 96%   BMI (P) 32.47 kg/mý     PHYSICAL EXAM:  Constitutional:       Appearance: Normal appearance.   HENT:      Head: Normocephalic.      Right Ear: External ear normal.      Left Ear: External ear normal.      Nose: Nose normal.      Mouth/Throat: No obvious deformity     Pharynx: Oropharynx is clear.   Eyes:      Conjunctiva/sclera: Conjunctivae normal.   Cardiovascular:      Rate and Rhythm: Normal rate.      Pulses: Normal pulses.   Pulmonary:      Effort: Pulmonary effort is normal.   Abdominal:      Palpations: Abdomen is soft.   Musculoskeletal:         General: Normal range of motion.      Cervical back: Normal range of motion.   Skin:     General: Skin is warm.   Neurological:      General: No focal deficit present.      Mental Status: He is alert and oriented to person, place, and time.     LAB RESULTS:  I have reviewed all the " available laboratory results in the chart.    RESULTS REVIEW:  I have reviewed the patient's all relevant laboratory and imaging findings.           ASSESSMENT & PLAN:  Latrell Morales is a 43 y.o. male with significant medical conditions as mentioned above presented to my clinic.    Diagnosis: Intractable hiccups, gastroesophageal reflux disease and hiatal hernia    He has evidence of pathological reflux but has no significant clinical presentation.  His hiccups resolved after stopping steroid injections for back pain.  I discussed with him the rationale, risk and benefit of surgical intervention.  Since he has no significant clinical symptoms, I do not recommend surgical intervention.  He can follow-up with us in future if he has significant reflux symptoms that does not improve with medical management.    I discussed the patients findings and my recommendations with the patient. The patient was given adequate time to ask questions and all questions were answered to patient satisfaction.    Oliver Novoa MD  Thoracic Surgeon  [facility]        Dictated utilizing Dragon dictation    I spent 40 minutes caring for Latrell on this date of service. This time includes time spent by me in the following activities:preparing for the visit, reviewing tests, obtaining and/or reviewing a separately obtained history, performing a medically appropriate examination and/or evaluation , counseling and educating the patient/family/caregiver, ordering medications, tests, or procedures, referring and communicating with other health care professionals , documenting information in the medical record, independently interpreting results and communicating that information with the patient/family/caregiver, and care coordination and more than half the time was spent in direct face to face evaluation and decision making.

## 2023-11-27 ENCOUNTER — TELEPHONE (OUTPATIENT)
Dept: GASTROENTEROLOGY | Facility: CLINIC | Age: 43
End: 2023-11-27

## 2023-11-27 NOTE — TELEPHONE ENCOUNTER
Contacted Latrell Morales 1980 regarding the appointment no show with SHARONDA Peters on 11/27/23 @ 10:00 am.  Patient is aware that there is a 24-hour cancellation policy and understands that a no-show letter will be mailed to them at the address on file.The patient has declined to reschedule at this time.

## 2023-12-11 ENCOUNTER — OFFICE VISIT (OUTPATIENT)
Dept: FAMILY MEDICINE CLINIC | Age: 43
End: 2023-12-11
Payer: COMMERCIAL

## 2023-12-11 VITALS
BODY MASS INDEX: 33.92 KG/M2 | SYSTOLIC BLOOD PRESSURE: 126 MMHG | DIASTOLIC BLOOD PRESSURE: 82 MMHG | TEMPERATURE: 98.5 F | OXYGEN SATURATION: 97 % | HEIGHT: 69 IN | WEIGHT: 229 LBS | HEART RATE: 96 BPM

## 2023-12-11 DIAGNOSIS — J01.00 ACUTE NON-RECURRENT MAXILLARY SINUSITIS: ICD-10-CM

## 2023-12-11 DIAGNOSIS — Z79.899 OTHER LONG TERM (CURRENT) DRUG THERAPY: ICD-10-CM

## 2023-12-11 DIAGNOSIS — I51.7 LEFT VENTRICULAR HYPERTROPHY: ICD-10-CM

## 2023-12-11 DIAGNOSIS — Z12.5 SCREENING FOR PROSTATE CANCER: ICD-10-CM

## 2023-12-11 DIAGNOSIS — Z00.00 PHYSICAL EXAM: Primary | ICD-10-CM

## 2023-12-11 DIAGNOSIS — I50.20 SYSTOLIC CONGESTIVE HEART FAILURE, UNSPECIFIED HF CHRONICITY: ICD-10-CM

## 2023-12-11 LAB
EXPIRATION DATE: NORMAL
EXPIRATION DATE: NORMAL
FLUAV AG UPPER RESP QL IA.RAPID: NOT DETECTED
FLUBV AG UPPER RESP QL IA.RAPID: NOT DETECTED
INTERNAL CONTROL: NORMAL
INTERNAL CONTROL: NORMAL
Lab: NORMAL
Lab: NORMAL
S PYO AG THROAT QL: NEGATIVE
SARS-COV-2 AG UPPER RESP QL IA.RAPID: NOT DETECTED

## 2023-12-11 PROCEDURE — 87880 STREP A ASSAY W/OPTIC: CPT | Performed by: FAMILY MEDICINE

## 2023-12-11 PROCEDURE — 87081 CULTURE SCREEN ONLY: CPT | Performed by: FAMILY MEDICINE

## 2023-12-11 PROCEDURE — 87428 SARSCOV & INF VIR A&B AG IA: CPT | Performed by: FAMILY MEDICINE

## 2023-12-11 PROCEDURE — 99396 PREV VISIT EST AGE 40-64: CPT | Performed by: FAMILY MEDICINE

## 2023-12-11 RX ORDER — AZITHROMYCIN 250 MG/1
TABLET, FILM COATED ORAL
Qty: 6 TABLET | Refills: 0 | Status: SHIPPED | OUTPATIENT
Start: 2023-12-11

## 2023-12-11 RX ORDER — GABAPENTIN 300 MG/1
300 CAPSULE ORAL 2 TIMES DAILY PRN
COMMUNITY
Start: 2023-10-02

## 2023-12-11 RX ORDER — PANTOPRAZOLE SODIUM 40 MG/1
1 TABLET, DELAYED RELEASE ORAL DAILY
COMMUNITY
Start: 2023-09-28

## 2023-12-11 NOTE — PROGRESS NOTES
Latrell Morales presents to [facility].    Chief Complaint:  Annual physical, sinus symptoms    Subjective   History of Present Illness:  Latrell is in today for wellness exam.  He is 43 years old and works for [employer] where he has been for almost 24 years.  He does not smoke cigarettes.  He drinks socially but not much.  How much he drinks will vary somewhat.  He denies any problem drinking.      In addition to the above, Latrell is here for sinus symptoms.  He has not felt well since Friday roughly 3 days ago.  His symptoms have included sinus pressure, headache, sore throat, and drainage.  He denies any fever or chills with this.  He states it reminds him of a typical sinus infection that he would get.    Review of Systems:  Review of Systems   Constitutional:  Negative for chills and fever.   HENT:  Positive for congestion, sinus pressure and sore throat.    Respiratory:  Positive for cough. Negative for shortness of breath.    Cardiovascular:  Negative for chest pain and palpitations.   Gastrointestinal:  Negative for abdominal pain, nausea and vomiting.        Objective   Medical History:  Past Medical History:    Abnormal electrocardiogram    Abnormal level of hormones in specimens from other organs, systems and tissues    High risk sexual behavior    Insomnia    Nontoxic goiter    Other specified anxiety disorders     Past Surgical History:    ENDOSCOPY    Procedure: ESOPHAGOGASTRODUODENOSCOPY WITH BIOPSIES;  Surgeon: Franko Bowles MD;  Location: [facility] ENDOSCOPY;  Service: Gastroenterology;  Laterality: N/A;  SMALL HIATAL HERNIA, ESOPHAGITIS    KNEE SURGERY    xfive- titanium plate and screws    UPPER GASTROINTESTINAL ENDOSCOPY    VASECTOMY      Family History   Problem Relation Age of Onset    No Known Problems Mother     Suicidality Father         cause of death    No Known Problems Sister     Coronary artery disease Maternal Grandmother     Heart failure Maternal  "Grandfather         cause of death    Heart attack Paternal Grandfather         cause of death    Colon cancer Neg Hx     Malig Hyperthermia Neg Hx      Social History     Tobacco Use    Smoking status: Never     Passive exposure: Past    Smokeless tobacco: Never   Substance Use Topics    Alcohol use: Yes     Comment: rare       Health Maintenance Due   Topic Date Due    BMI FOLLOWUP  05/02/2023        Immunization History   Administered Date(s) Administered    Tdap 05/03/2017       Allergies   Allergen Reactions    Adhesive Tape Hives and Rash    Latex Rash    Sertraline Hcl Other (See Comments)     Erectile dysfunction        Medications:  Current Outpatient Medications on File Prior to Visit   Medication Sig    gabapentin (NEURONTIN) 300 MG capsule Take 1 capsule by mouth 2 (Two) Times a Day As Needed.    HYDROcodone-acetaminophen (NORCO) 5-325 MG per tablet Take 1 tablet by mouth As Needed.    ibuprofen (ADVIL,MOTRIN) 800 MG tablet Take 1 tablet by mouth Every 8 (Eight) Hours As Needed.    pantoprazole (PROTONIX) 40 MG EC tablet Take 1 tablet by mouth Daily.    sildenafil (VIAGRA) 100 MG tablet TAKE 1/2 TO 1 TABLET BY MOUTH ONE HOUR PRIOR TO SEX    simethicone (HM Gas Relief) 80 MG chewable tablet Chew 1 tablet Every 6 (Six) Hours As Needed for Flatulence.    [DISCONTINUED] baclofen (LIORESAL) 10 MG tablet TAKE ONE TABLET BY MOUTH THREE TIMES DAILY (Patient not taking: Reported on 10/2/2023)    [DISCONTINUED] buPROPion XL (WELLBUTRIN XL) 150 MG 24 hr tablet TAKE ONE TABLET BY MOUTH EVERY DAY    [DISCONTINUED] metoclopramide (REGLAN) 10 MG tablet TAKE ONE TABLET BY MOUTH THREE TIMES DAILY (Patient not taking: Reported on 10/2/2023)     No current facility-administered medications on file prior to visit.       Vital Signs:   /81 (BP Location: Right arm, Patient Position: Sitting)   Pulse 98   Temp 98.5 °F (36.9 °C) (Oral)   Ht 175.3 cm (69.02\")   Wt 104 kg (229 lb)   SpO2 97%   BMI 33.80 kg/m²   "     Physical Exam:  Physical Exam  Vitals and nursing note reviewed.   Constitutional:       General: He is not in acute distress.     Appearance: He is not ill-appearing.   HENT:      Right Ear: Tympanic membrane and ear canal normal.      Left Ear: Tympanic membrane and ear canal normal.      Mouth/Throat:      Mouth: Mucous membranes are moist.      Comments: Pharynx appears normal  Eyes:      Extraocular Movements: Extraocular movements intact.      Pupils: Pupils are equal, round, and reactive to light.   Neck:      Thyroid: No thyromegaly.   Cardiovascular:      Rate and Rhythm: Normal rate and regular rhythm.      Heart sounds: No murmur heard.  Pulmonary:      Effort: Pulmonary effort is normal.      Breath sounds: Normal breath sounds.   Abdominal:      General: There is no distension.      Palpations: Abdomen is soft. There is no mass.      Tenderness: There is no abdominal tenderness.   Musculoskeletal:      Cervical back: Normal range of motion.   Skin:     Findings: No lesion or rash.   Neurological:      General: No focal deficit present.      Mental Status: He is oriented to person, place, and time.      Cranial Nerves: No cranial nerve deficit.   Psychiatric:         Mood and Affect: Mood normal.         Result Review   The following data was reviewed by Raffi Pierson MD on 12/11/2023.  Lab Results   Component Value Date    WBC 5.32 02/23/2023    HGB 14.5 02/23/2023    HCT 45.6 02/23/2023    MCV 91.8 02/23/2023     02/23/2023     Lab Results   Component Value Date    GLUCOSE 88 02/23/2023    BUN 16 02/23/2023    CREATININE 1.03 02/23/2023     02/23/2023    K 4.6 02/23/2023     02/23/2023    CO2 25.3 02/23/2023    CALCIUM 8.5 (L) 02/23/2023    PROTEINTOT 6.4 02/23/2023    ALBUMIN 3.6 02/23/2023    ALT 23 02/23/2023    AST 17 02/23/2023    ALKPHOS 29 (L) 02/23/2023    BILITOT 0.4 02/23/2023    EGFR 93.0 02/23/2023    GLOB 2.8 02/23/2023    AGRATIO 1.3 02/23/2023    BCR 15.5  "02/23/2023    ANIONGAP 7.7 02/23/2023      Lab Results   Component Value Date    CHOL 108 05/02/2022    CHLPL 130 11/17/2020    TRIG 62 05/02/2022    HDL 39 (L) 05/02/2022    LDL 55 05/02/2022     Lab Results   Component Value Date    TSH 0.873 02/23/2023     No results found for: \"HGBA1C\"  Lab Results   Component Value Date    PSA 0.882 05/02/2022    PSA 0.74 11/17/2020     Lab Results   Component Value Date    TESTOSTERONE >1,500.00 (H) 05/02/2022     BMI is >= 30 and <35. (Class 1 Obesity). The following options were offered after discussion;: exercise counseling/recommendations and nutrition counseling/recommendations         Assessment and Plan:   Today, we have reviewed his care.  Test for COVID and flu are pending, and we will reach out to him by telephone later today with those results.  If the tests are negative, I am likely to treat him for a sinus infection given his symptoms.  Otherwise, we have reviewed his usual care.  We will set Latrell up for routine blood work after the first of the year.  He also asked that we recheck on his heart midyear next year.  I have placed orders for a follow-up echocardiogram.  We will move forward from there.  He does not feel well today.  He will miss work tonight and tentatively plan to return tomorrow night.    Diagnoses and all orders for this visit:    1. Physical exam (Primary)  -     CBC (No Diff); Future  -     Comprehensive Metabolic Panel; Future  -     Lipid Panel; Future  -     TSH; Future  -     PSA Screen; Future  -     Urinalysis With Microscopic - Urine, Clean Catch; Future  -     Testosterone; Future    2. Acute non-recurrent maxillary sinusitis  -     POCT SARS-CoV-2 Antigen JESSICA + Flu  -     POCT rapid strep A    3. Systolic congestive heart failure, unspecified HF chronicity  -     Adult Transthoracic Echo Complete W/ Cont if Necessary Per Protocol; Future    4. Left ventricular hypertrophy  -     Adult Transthoracic Echo Complete W/ Cont if Necessary Per " Protocol; Future    5. Screening for prostate cancer  -     PSA Screen; Future    6. Other long term (current) drug therapy  -     CBC (No Diff); Future  -     Testosterone; Future    Follow Up  Return if symptoms worsen or fail to improve.  Patient was given instructions and counseling regarding his condition or for health maintenance advice. Please see specific information pulled into the AVS if appropriate.

## 2023-12-13 LAB — BACTERIA SPEC AEROBE CULT: NORMAL

## 2024-02-05 ENCOUNTER — LAB (OUTPATIENT)
Dept: LAB | Facility: HOSPITAL | Age: 44
End: 2024-02-05
Payer: COMMERCIAL

## 2024-02-05 DIAGNOSIS — Z79.899 OTHER LONG TERM (CURRENT) DRUG THERAPY: ICD-10-CM

## 2024-02-05 DIAGNOSIS — Z00.00 PHYSICAL EXAM: ICD-10-CM

## 2024-02-05 DIAGNOSIS — Z12.5 SCREENING FOR PROSTATE CANCER: ICD-10-CM

## 2024-02-05 LAB
ALBUMIN SERPL-MCNC: 3.9 G/DL (ref 3.5–5.2)
ALBUMIN/GLOB SERPL: 1.6 G/DL
ALP SERPL-CCNC: 45 U/L (ref 39–117)
ALT SERPL W P-5'-P-CCNC: 30 U/L (ref 1–41)
ANION GAP SERPL CALCULATED.3IONS-SCNC: 9.1 MMOL/L (ref 5–15)
AST SERPL-CCNC: 26 U/L (ref 1–40)
BACTERIA UR QL AUTO: NORMAL /HPF
BILIRUB SERPL-MCNC: 0.4 MG/DL (ref 0–1.2)
BILIRUB UR QL STRIP: NEGATIVE
BUN SERPL-MCNC: 16 MG/DL (ref 6–20)
BUN/CREAT SERPL: 12.5 (ref 7–25)
CALCIUM SPEC-SCNC: 8.5 MG/DL (ref 8.6–10.5)
CHLORIDE SERPL-SCNC: 105 MMOL/L (ref 98–107)
CHOLEST SERPL-MCNC: 132 MG/DL (ref 0–200)
CLARITY UR: CLEAR
CO2 SERPL-SCNC: 23.9 MMOL/L (ref 22–29)
COLOR UR: YELLOW
CREAT SERPL-MCNC: 1.28 MG/DL (ref 0.76–1.27)
DEPRECATED RDW RBC AUTO: 45.7 FL (ref 37–54)
EGFRCR SERPLBLD CKD-EPI 2021: 71.2 ML/MIN/1.73
ERYTHROCYTE [DISTWIDTH] IN BLOOD BY AUTOMATED COUNT: 13.4 % (ref 12.3–15.4)
GLOBULIN UR ELPH-MCNC: 2.4 GM/DL
GLUCOSE SERPL-MCNC: 61 MG/DL (ref 65–99)
GLUCOSE UR STRIP-MCNC: NEGATIVE MG/DL
HCT VFR BLD AUTO: 47.8 % (ref 37.5–51)
HDLC SERPL-MCNC: 25 MG/DL (ref 40–60)
HGB BLD-MCNC: 15.4 G/DL (ref 13–17.7)
HGB UR QL STRIP.AUTO: NEGATIVE
KETONES UR QL STRIP: NEGATIVE
LDLC SERPL CALC-MCNC: 88 MG/DL (ref 0–100)
LDLC/HDLC SERPL: 3.46 {RATIO}
LEUKOCYTE ESTERASE UR QL STRIP.AUTO: NEGATIVE
MCH RBC QN AUTO: 29.4 PG (ref 26.6–33)
MCHC RBC AUTO-ENTMCNC: 32.2 G/DL (ref 31.5–35.7)
MCV RBC AUTO: 91.2 FL (ref 79–97)
NITRITE UR QL STRIP: NEGATIVE
PH UR STRIP.AUTO: 7 [PH] (ref 5–8)
PLATELET # BLD AUTO: 229 10*3/MM3 (ref 140–450)
PMV BLD AUTO: 9.2 FL (ref 6–12)
POTASSIUM SERPL-SCNC: 4.7 MMOL/L (ref 3.5–5.2)
PROT SERPL-MCNC: 6.3 G/DL (ref 6–8.5)
PROT UR QL STRIP: ABNORMAL
PSA SERPL-MCNC: 0.8 NG/ML (ref 0–4)
RBC # BLD AUTO: 5.24 10*6/MM3 (ref 4.14–5.8)
RBC # UR STRIP: NORMAL /HPF
REF LAB TEST METHOD: NORMAL
SODIUM SERPL-SCNC: 138 MMOL/L (ref 136–145)
SP GR UR STRIP: 1.02 (ref 1–1.03)
SQUAMOUS #/AREA URNS HPF: NORMAL /HPF
TESTOST SERPL-MCNC: >1500 NG/DL (ref 249–836)
TRIGL SERPL-MCNC: 103 MG/DL (ref 0–150)
TSH SERPL DL<=0.05 MIU/L-ACNC: 1.97 UIU/ML (ref 0.27–4.2)
UROBILINOGEN UR QL STRIP: ABNORMAL
VLDLC SERPL-MCNC: 19 MG/DL (ref 5–40)
WBC # UR STRIP: NORMAL /HPF
WBC NRBC COR # BLD AUTO: 4.45 10*3/MM3 (ref 3.4–10.8)

## 2024-02-05 PROCEDURE — 80050 GENERAL HEALTH PANEL: CPT

## 2024-02-05 PROCEDURE — 80061 LIPID PANEL: CPT

## 2024-02-05 PROCEDURE — 84403 ASSAY OF TOTAL TESTOSTERONE: CPT

## 2024-02-05 PROCEDURE — G0103 PSA SCREENING: HCPCS

## 2024-02-05 PROCEDURE — 36415 COLL VENOUS BLD VENIPUNCTURE: CPT

## 2024-02-05 PROCEDURE — 81001 URINALYSIS AUTO W/SCOPE: CPT

## 2024-02-08 DIAGNOSIS — N52.9 ERECTILE DYSFUNCTION, UNSPECIFIED ERECTILE DYSFUNCTION TYPE: ICD-10-CM

## 2024-02-09 RX ORDER — SILDENAFIL 100 MG/1
TABLET, FILM COATED ORAL
Qty: 30 TABLET | Refills: 2 | Status: SHIPPED | OUTPATIENT
Start: 2024-02-09

## 2024-06-26 ENCOUNTER — TELEPHONE (OUTPATIENT)
Dept: SLEEP MEDICINE | Facility: HOSPITAL | Age: 44
End: 2024-06-26
Payer: COMMERCIAL

## 2024-06-26 NOTE — TELEPHONE ENCOUNTER
Left message for patient to schedule annual follow up visit at [facility] at [phone], option 1 to schedule.

## 2024-06-27 RX ORDER — PANTOPRAZOLE SODIUM 40 MG/1
40 TABLET, DELAYED RELEASE ORAL DAILY
Qty: 90 TABLET | Refills: 1 | OUTPATIENT
Start: 2024-06-27

## 2024-06-27 NOTE — TELEPHONE ENCOUNTER
Medication Requested pantoprazole (PROTONIX) 40 MG EC tablet    Last Refill 3/28/2024    Last OV 7/25/2023    Next OV none scheduled    Medication pended for approval and correct pharmacy verified yes

## 2024-08-29 RX ORDER — PANTOPRAZOLE SODIUM 40 MG/1
40 TABLET, DELAYED RELEASE ORAL DAILY
Qty: 90 TABLET | Refills: 1 | Status: SHIPPED | OUTPATIENT
Start: 2024-08-29

## 2024-09-09 DIAGNOSIS — N52.9 ERECTILE DYSFUNCTION, UNSPECIFIED ERECTILE DYSFUNCTION TYPE: ICD-10-CM

## 2024-09-09 RX ORDER — SILDENAFIL 100 MG/1
TABLET, FILM COATED ORAL
Qty: 30 TABLET | Refills: 2 | Status: SHIPPED | OUTPATIENT
Start: 2024-09-09

## 2025-01-20 ENCOUNTER — OFFICE VISIT (OUTPATIENT)
Dept: FAMILY MEDICINE CLINIC | Age: 45
End: 2025-01-20
Payer: COMMERCIAL

## 2025-01-20 ENCOUNTER — LAB (OUTPATIENT)
Dept: LAB | Facility: HOSPITAL | Age: 45
End: 2025-01-20
Payer: COMMERCIAL

## 2025-01-20 VITALS
OXYGEN SATURATION: 96 % | BODY MASS INDEX: 35.55 KG/M2 | SYSTOLIC BLOOD PRESSURE: 136 MMHG | WEIGHT: 240 LBS | HEIGHT: 69 IN | TEMPERATURE: 98.4 F | DIASTOLIC BLOOD PRESSURE: 76 MMHG | HEART RATE: 108 BPM

## 2025-01-20 DIAGNOSIS — Z00.00 PHYSICAL EXAM: ICD-10-CM

## 2025-01-20 DIAGNOSIS — I50.20 SYSTOLIC CONGESTIVE HEART FAILURE, UNSPECIFIED HF CHRONICITY: ICD-10-CM

## 2025-01-20 DIAGNOSIS — R79.89 ELEVATED SERUM CREATININE: ICD-10-CM

## 2025-01-20 DIAGNOSIS — I51.7 LEFT VENTRICULAR HYPERTROPHY: ICD-10-CM

## 2025-01-20 DIAGNOSIS — R22.9 SKIN MASS: ICD-10-CM

## 2025-01-20 DIAGNOSIS — R06.09 DYSPNEA ON EXERTION: ICD-10-CM

## 2025-01-20 DIAGNOSIS — R07.9 CHEST PAIN, UNSPECIFIED TYPE: ICD-10-CM

## 2025-01-20 DIAGNOSIS — Z00.00 PHYSICAL EXAM: Primary | ICD-10-CM

## 2025-01-20 DIAGNOSIS — N52.9 ERECTILE DYSFUNCTION, UNSPECIFIED ERECTILE DYSFUNCTION TYPE: ICD-10-CM

## 2025-01-20 DIAGNOSIS — R94.31 ABNORMAL EKG: ICD-10-CM

## 2025-01-20 LAB
ALBUMIN SERPL-MCNC: 3.8 G/DL (ref 3.5–5.2)
ALBUMIN/GLOB SERPL: 1.3 G/DL
ALP SERPL-CCNC: 38 U/L (ref 39–117)
ALT SERPL W P-5'-P-CCNC: 33 U/L (ref 1–41)
ANION GAP SERPL CALCULATED.3IONS-SCNC: 11.9 MMOL/L (ref 5–15)
AST SERPL-CCNC: 31 U/L (ref 1–40)
BACTERIA UR QL AUTO: NORMAL /HPF
BASOPHILS # BLD AUTO: 0.03 10*3/MM3 (ref 0–0.2)
BASOPHILS NFR BLD AUTO: 0.5 % (ref 0–1.5)
BILIRUB SERPL-MCNC: 0.6 MG/DL (ref 0–1.2)
BILIRUB UR QL STRIP: NEGATIVE
BUN SERPL-MCNC: 16 MG/DL (ref 6–20)
BUN/CREAT SERPL: 10.7 (ref 7–25)
CALCIUM SPEC-SCNC: 9.3 MG/DL (ref 8.6–10.5)
CHLORIDE SERPL-SCNC: 100 MMOL/L (ref 98–107)
CHOLEST SERPL-MCNC: 214 MG/DL (ref 0–200)
CLARITY UR: ABNORMAL
CO2 SERPL-SCNC: 25.1 MMOL/L (ref 22–29)
COLOR UR: ABNORMAL
CREAT SERPL-MCNC: 1.49 MG/DL (ref 0.76–1.27)
DEPRECATED RDW RBC AUTO: 41.5 FL (ref 37–54)
EGFRCR SERPLBLD CKD-EPI 2021: 59 ML/MIN/1.73
EOSINOPHIL # BLD AUTO: 0.1 10*3/MM3 (ref 0–0.4)
EOSINOPHIL NFR BLD AUTO: 1.5 % (ref 0.3–6.2)
ERYTHROCYTE [DISTWIDTH] IN BLOOD BY AUTOMATED COUNT: 12.1 % (ref 12.3–15.4)
GLOBULIN UR ELPH-MCNC: 3 GM/DL
GLUCOSE SERPL-MCNC: 87 MG/DL (ref 65–99)
GLUCOSE UR STRIP-MCNC: NEGATIVE MG/DL
HCT VFR BLD AUTO: 48.9 % (ref 37.5–51)
HDLC SERPL-MCNC: 19 MG/DL (ref 40–60)
HGB BLD-MCNC: 15.5 G/DL (ref 13–17.7)
HGB UR QL STRIP.AUTO: NEGATIVE
HYALINE CASTS UR QL AUTO: NORMAL /LPF
IMM GRANULOCYTES # BLD AUTO: 0.04 10*3/MM3 (ref 0–0.05)
IMM GRANULOCYTES NFR BLD AUTO: 0.6 % (ref 0–0.5)
KETONES UR QL STRIP: NEGATIVE
LDLC SERPL CALC-MCNC: 169 MG/DL (ref 0–100)
LDLC/HDLC SERPL: 8.81 {RATIO}
LEUKOCYTE ESTERASE UR QL STRIP.AUTO: ABNORMAL
LYMPHOCYTES # BLD AUTO: 1.02 10*3/MM3 (ref 0.7–3.1)
LYMPHOCYTES NFR BLD AUTO: 15.5 % (ref 19.6–45.3)
MCH RBC QN AUTO: 29.2 PG (ref 26.6–33)
MCHC RBC AUTO-ENTMCNC: 31.7 G/DL (ref 31.5–35.7)
MCV RBC AUTO: 92.3 FL (ref 79–97)
MONOCYTES # BLD AUTO: 0.42 10*3/MM3 (ref 0.1–0.9)
MONOCYTES NFR BLD AUTO: 6.4 % (ref 5–12)
NEUTROPHILS NFR BLD AUTO: 4.98 10*3/MM3 (ref 1.7–7)
NEUTROPHILS NFR BLD AUTO: 75.5 % (ref 42.7–76)
NITRITE UR QL STRIP: NEGATIVE
NRBC BLD AUTO-RTO: 0 /100 WBC (ref 0–0.2)
NT-PROBNP SERPL-MCNC: <36 PG/ML (ref 0–450)
PH UR STRIP.AUTO: 6.5 [PH] (ref 5–8)
PLATELET # BLD AUTO: 299 10*3/MM3 (ref 140–450)
PMV BLD AUTO: 9.9 FL (ref 6–12)
POTASSIUM SERPL-SCNC: 4.7 MMOL/L (ref 3.5–5.2)
PROT SERPL-MCNC: 6.8 G/DL (ref 6–8.5)
PROT UR QL STRIP: ABNORMAL
PSA SERPL-MCNC: 0.78 NG/ML (ref 0–4)
RBC # BLD AUTO: 5.3 10*6/MM3 (ref 4.14–5.8)
RBC # UR STRIP: NORMAL /HPF
REF LAB TEST METHOD: NORMAL
SODIUM SERPL-SCNC: 137 MMOL/L (ref 136–145)
SP GR UR STRIP: >1.03 (ref 1–1.03)
SQUAMOUS #/AREA URNS HPF: NORMAL /HPF
TESTOST SERPL-MCNC: >1500 NG/DL (ref 249–836)
TRIGL SERPL-MCNC: 138 MG/DL (ref 0–150)
TSH SERPL DL<=0.05 MIU/L-ACNC: 2.16 UIU/ML (ref 0.27–4.2)
UROBILINOGEN UR QL STRIP: ABNORMAL
VLDLC SERPL-MCNC: 26 MG/DL (ref 5–40)
WBC # UR STRIP: NORMAL /HPF
WBC NRBC COR # BLD AUTO: 6.59 10*3/MM3 (ref 3.4–10.8)

## 2025-01-20 PROCEDURE — 36415 COLL VENOUS BLD VENIPUNCTURE: CPT

## 2025-01-20 PROCEDURE — 93000 ELECTROCARDIOGRAM COMPLETE: CPT | Performed by: FAMILY MEDICINE

## 2025-01-20 PROCEDURE — 99396 PREV VISIT EST AGE 40-64: CPT | Performed by: FAMILY MEDICINE

## 2025-01-20 PROCEDURE — 84403 ASSAY OF TOTAL TESTOSTERONE: CPT

## 2025-01-20 PROCEDURE — 81001 URINALYSIS AUTO W/SCOPE: CPT

## 2025-01-20 PROCEDURE — 83880 ASSAY OF NATRIURETIC PEPTIDE: CPT

## 2025-01-20 PROCEDURE — 80050 GENERAL HEALTH PANEL: CPT

## 2025-01-20 PROCEDURE — 84153 ASSAY OF PSA TOTAL: CPT

## 2025-01-20 PROCEDURE — 82610 CYSTATIN C: CPT

## 2025-01-20 PROCEDURE — 80061 LIPID PANEL: CPT

## 2025-01-20 RX ORDER — SILDENAFIL 100 MG/1
TABLET, FILM COATED ORAL
Qty: 30 TABLET | Refills: 2 | Status: SHIPPED | OUTPATIENT
Start: 2025-01-20

## 2025-01-20 RX ORDER — GABAPENTIN 400 MG/1
1 CAPSULE ORAL 3 TIMES DAILY
COMMUNITY
Start: 2024-12-24

## 2025-01-20 NOTE — PROGRESS NOTES
"Latrell Morales presents to [facility].    Chief Complaint:  Annual physical    Subjective   History of Present Illness:  Latrell is being seen today for annual physical and follow-up.  He is 44 years old and works for [employer] where he has been for almost 26 years.  He does not smoke cigarettes and never has.  Latrell estimates that he drinks 4-5 beers on a weekly basis.  He denies concerns about his alcohol use.  He is not due for any particular cancer screenings in the short-term.  Colon cancer screening would be a consideration later this year, when he turns 45.    Latrell says that he is concerned regarding shortness of breath.  This is different for him.  He says that he runs 'out of steam' more quickly than he used to.  Something feels off over the last 2 months.  He has noted \"a little tightness\" in the chest, but he is not having pain per se.    Review of Systems:  Review of Systems   Constitutional:  Negative for chills and fever.   Respiratory:  Positive for chest tightness and shortness of breath. Negative for cough.    Cardiovascular:  Negative for chest pain and palpitations.   Gastrointestinal:  Negative for abdominal pain, nausea and vomiting.      Objective   Medical History:  Past Medical History:    Abnormal electrocardiogram    Abnormal level of hormones in specimens from other organs, systems and tissues    High risk sexual behavior    Insomnia    Nontoxic goiter    Other specified anxiety disorders     Past Surgical History:    ENDOSCOPY    Hiatal hernia, esophagitis    KNEE SURGERY    xfive- titanium plate and screws    UPPER GASTROINTESTINAL ENDOSCOPY    VASECTOMY      Family History   Problem Relation Age of Onset    No Known Problems Mother     Suicidality Father         cause of death    No Known Problems Sister     Coronary artery disease Maternal Grandmother     Heart failure Maternal Grandfather         cause of death    Heart attack Paternal Grandfather         " "cause of death    Colon cancer Neg Hx     Malig Hyperthermia Neg Hx      Social History     Tobacco Use    Smoking status: Never     Passive exposure: Past    Smokeless tobacco: Never   Substance Use Topics    Alcohol use: Yes     Comment: rare       There are no preventive care reminders to display for this patient.       Immunization History   Administered Date(s) Administered    Tdap 05/03/2017       Allergies   Allergen Reactions    Adhesive Tape Hives and Rash    Latex Rash    Sertraline Hcl Other (See Comments)     Erectile dysfunction        Medications:    Current Outpatient Medications:     gabapentin (NEURONTIN) 400 MG capsule, Take 1 capsule by mouth 3 times a day., Disp: , Rfl:     HYDROcodone-acetaminophen (NORCO) 5-325 MG per tablet, Take 1 tablet by mouth As Needed., Disp: , Rfl:     ibuprofen (ADVIL,MOTRIN) 800 MG tablet, Take 1 tablet by mouth Every 8 (Eight) Hours As Needed., Disp: , Rfl:     pantoprazole (PROTONIX) 40 MG EC tablet, TAKE ONE TABLET BY MOUTH EVERY DAY, Disp: 90 tablet, Rfl: 1    sildenafil (VIAGRA) 100 MG tablet, TAKE 1/2 TO 1 TABLET BY MOUTH ONE HOUR PRIOR TO SEX, Disp: 30 tablet, Rfl: 2    Vital Signs:   /76 (BP Location: Right arm, Patient Position: Sitting)   Pulse 108   Temp 98.4 °F (36.9 °C) (Oral)   Ht 175.3 cm (69.02\")   Wt 109 kg (240 lb)   SpO2 96%   BMI 35.43 kg/m²       Physical Exam:  Physical Exam  Vitals and nursing note reviewed.   Constitutional:       General: He is not in acute distress.     Appearance: He is not ill-appearing.   HENT:      Right Ear: Tympanic membrane and ear canal normal.      Left Ear: Tympanic membrane and ear canal normal.      Mouth/Throat:      Mouth: Mucous membranes are moist.      Comments: Pharynx appears normal  Eyes:      Extraocular Movements: Extraocular movements intact.      Pupils: Pupils are equal, round, and reactive to light.   Neck:      Thyroid: No thyromegaly.   Cardiovascular:      Rate and Rhythm: Normal rate " "and regular rhythm.      Heart sounds: No murmur heard.  Pulmonary:      Effort: Pulmonary effort is normal.      Breath sounds: Normal breath sounds.   Abdominal:      General: There is no distension.      Palpations: Abdomen is soft. There is no mass.      Tenderness: There is no abdominal tenderness.   Musculoskeletal:      Cervical back: Normal range of motion.      Right lower leg: No edema.      Left lower leg: No edema.   Skin:     Findings: Lesion (Left lateral upper arm) present. No rash.   Neurological:      General: No focal deficit present.      Mental Status: He is oriented to person, place, and time.      Cranial Nerves: No cranial nerve deficit.   Psychiatric:         Mood and Affect: Mood normal.     Result Review   The following data was reviewed by Raffi Pierson MD on 01/20/2025.  Lab Results   Component Value Date    WBC 4.45 02/05/2024    HGB 15.4 02/05/2024    HCT 47.8 02/05/2024    MCV 91.2 02/05/2024     02/05/2024     Lab Results   Component Value Date    GLUCOSE 61 (L) 02/05/2024    BUN 16 02/05/2024    CREATININE 1.28 (H) 02/05/2024     02/05/2024    K 4.7 02/05/2024     02/05/2024    CALCIUM 8.5 (L) 02/05/2024    PROTEINTOT 6.3 02/05/2024    ALBUMIN 3.9 02/05/2024    ALT 30 02/05/2024    AST 26 02/05/2024    ALKPHOS 45 02/05/2024    BILITOT 0.4 02/05/2024    GLOB 2.4 02/05/2024    AGRATIO 1.6 02/05/2024    BCR 12.5 02/05/2024    ANIONGAP 9.1 02/05/2024    EGFR 71.2 02/05/2024     Lab Results   Component Value Date    CHOL 132 02/05/2024    CHLPL 130 11/17/2020    TRIG 103 02/05/2024    HDL 25 (L) 02/05/2024    LDL 88 02/05/2024     Lab Results   Component Value Date    TSH 1.970 02/05/2024     No results found for: \"HGBA1C\"  Lab Results   Component Value Date    PSA 0.797 02/05/2024    PSA 0.882 05/02/2022    PSA 0.74 11/17/2020     Stress Test With Myocardial Perfusion One Day (08/08/2022 12:11)   Adult Transthoracic Echo Complete W/ Cont if Necessary Per " Protocol (06/13/2022 11:44)     Class 2 Severe Obesity (BMI >=35 and <=39.9). Obesity-related health conditions include the following: osteoarthritis. Obesity is worsening. BMI is is above average; BMI management plan is completed. We discussed portion control and increasing exercise.    ECG 12 Lead    Date/Time: 1/20/2025 4:25 PM  Performed by: Raffi Pierson MD    Authorized by: Raffi Pierson MD  Comparison: compared with previous ECG from 6/28/2022  Similar to previous ECG  Rhythm: sinus rhythm  Rate: normal  BPM: 87  Conduction: conduction normal  ST Segments: ST segments normal  ST segment elevation noted on lead: Slurring of the ST segments in V2 and V3.  T flattening: III and aVF  QRS axis: normal  Other findings: T wave abnormality, left atrial abnormality and early repolarization    Clinical impression: abnormal EKG  Clinical impression comment: This is an abnormal EKG with findings as above.  Specifically, there are T wave inversions in 2 of the inferior leads.  Overall, the EKG is similar to previous tracing.          Assessment and Plan:   Today, we have reviewed his care.  Latrell continues to be in what seems good health at this time.  Regarding cancer screenings, he will be due for some type of colon cancer screening this fall, and we will reach out to him then.  PSA will be checked today not so much because of the need to screen but because of ongoing testosterone use.  Latrell remains concerned about breathing.  He is having shortness of breath with exertion that seems different over the last couple of months.  We will move forward with an EKG and laboratory evaluation as noted.  I am likely to at least recommend a repeat echocardiogram to assess his cardiac function.  He was noted to have some degree of hypertrophic cardiomyopathy on previous testing, and this likely needs to be looked at again.  Radiology did not comment on coronary artery calcifications when his CT chest was done.  We  may want to consider screening in that regard as well.  Certainly stress test would be an option as well.  Again, what we will see what the testing shows and reach back out to him likely tomorrow with additional guidance.    Diagnoses and all orders for this visit:    1. Physical exam (Primary)  -     Comprehensive Metabolic Panel; Future  -     Lipid Panel; Future  -     TSH; Future  -     PSA DIAGNOSTIC; Future  -     ECG 12 Lead  -     Testosterone; Future  -     Cystatin C; Future  -     CBC Auto Differential; Future  -     BNP; Future  -     Urinalysis With Microscopic - Urine, Clean Catch; Future    2. Dyspnea on exertion  -     ECG 12 Lead  -     BNP; Future    3. Systolic congestive heart failure, unspecified HF chronicity  -     ECG 12 Lead  -     BNP; Future    4. Left ventricular hypertrophy  -     ECG 12 Lead    5. Skin mass  -     Ambulatory Referral to Dermatology    6. Elevated serum creatinine  -     Cystatin C; Future  -     Urinalysis With Microscopic - Urine, Clean Catch; Future    7. Erectile dysfunction, unspecified erectile dysfunction type  -     sildenafil (VIAGRA) 100 MG tablet; TAKE 1/2 TO 1 TABLET BY MOUTH ONE HOUR PRIOR TO SEX  Dispense: 30 tablet; Refill: 2    Follow Up  Return if symptoms worsen or fail to improve.  Patient was given instructions and counseling regarding his condition or for health maintenance advice. Please see specific information pulled into the AVS if appropriate.

## 2025-01-22 LAB — CYSTATIN C SERPL-MCNC: 1.12 MG/L (ref 0.6–1)

## 2025-02-22 DIAGNOSIS — R06.6 INTRACTABLE HICCUPS: Primary | ICD-10-CM

## 2025-02-22 DIAGNOSIS — R11.2 NAUSEA AND VOMITING, UNSPECIFIED VOMITING TYPE: ICD-10-CM

## 2025-02-22 DIAGNOSIS — K44.9 HIATAL HERNIA: ICD-10-CM

## 2025-02-24 RX ORDER — PANTOPRAZOLE SODIUM 40 MG/1
40 TABLET, DELAYED RELEASE ORAL DAILY
Qty: 90 TABLET | Refills: 1 | Status: SHIPPED | OUTPATIENT
Start: 2025-02-24

## 2025-02-24 NOTE — TELEPHONE ENCOUNTER
Medication Requested pantoprazole    Last Refill 11/25/2024    Last OV 7/25/2023    Next OV none scheduled

## 2025-03-10 ENCOUNTER — HOSPITAL ENCOUNTER (OUTPATIENT)
Facility: HOSPITAL | Age: 45
Discharge: HOME | End: 2025-03-10
Payer: COMMERCIAL

## 2025-03-10 DIAGNOSIS — I51.7 LEFT VENTRICULAR HYPERTROPHY: ICD-10-CM

## 2025-03-10 DIAGNOSIS — R07.9 CHEST PAIN, UNSPECIFIED TYPE: ICD-10-CM

## 2025-03-10 DIAGNOSIS — R94.31 ABNORMAL EKG: ICD-10-CM

## 2025-03-10 DIAGNOSIS — R06.09 DYSPNEA ON EXERTION: ICD-10-CM

## 2025-03-10 DIAGNOSIS — I50.20 SYSTOLIC CONGESTIVE HEART FAILURE, UNSPECIFIED HF CHRONICITY: ICD-10-CM

## 2025-03-10 LAB
AORTIC ARCH: 3 CM
ASCENDING AORTA: 3.4 CM
AV MEAN PRESS GRAD SYS DOP V1V2: 5 MMHG
AV VMAX SYS DOP: 141 CM/SEC
BH CV ECHO MEAS - AO MAX PG: 8 MMHG
BH CV ECHO MEAS - AO V2 VTI: 24.2 CM
BH CV ECHO MEAS - AVA(I,D): 3.1 CM2
BH CV ECHO MEAS - EDV(MOD-SP2): 223.2 ML
BH CV ECHO MEAS - EDV(MOD-SP4): 196.1 ML
BH CV ECHO MEAS - EF(MOD-SP2): 50 %
BH CV ECHO MEAS - EF(MOD-SP4): 46.9 %
BH CV ECHO MEAS - ESV(MOD-SP2): 111.5 ML
BH CV ECHO MEAS - ESV(MOD-SP4): 104.1 ML
BH CV ECHO MEAS - IVS/LVPW: 1.31 CM
BH CV ECHO MEAS - IVSD: 1.2 CM
BH CV ECHO MEAS - LA DIMENSION: 3.9 CM
BH CV ECHO MEAS - LAT PEAK E' VEL: 11.4 CM/SEC
BH CV ECHO MEAS - LV DIASTOLIC VOL/BSA (35-75): 88 CM2
BH CV ECHO MEAS - LV MAX PG: 4 MMHG
BH CV ECHO MEAS - LV MEAN PG: 2.3 MMHG
BH CV ECHO MEAS - LV SYSTOLIC VOL/BSA (12-30): 46.7 CM2
BH CV ECHO MEAS - LV V1 MAX: 100 CM/SEC
BH CV ECHO MEAS - LV V1 VTI: 16.6 CM
BH CV ECHO MEAS - LVIDD: 5.2 CM
BH CV ECHO MEAS - LVIDS: 4.1 CM
BH CV ECHO MEAS - LVOT AREA: 4.5 CM2
BH CV ECHO MEAS - LVOT DIAM: 2.4 CM
BH CV ECHO MEAS - LVPWD: 1.2 CM
BH CV ECHO MEAS - MED PEAK E' VEL: 9 CM/SEC
BH CV ECHO MEAS - MV A MAX VEL: 60.5 CM/SEC
BH CV ECHO MEAS - MV E MAX VEL: 60.5 CM/SEC
BH CV ECHO MEAS - MV E/A: 1
BH CV ECHO MEAS - SV(LVOT): 75 ML
BH CV ECHO MEAS - SV(MOD-SP2): 111.7 ML
BH CV ECHO MEAS - SV(MOD-SP4): 92 ML
BH CV ECHO MEAS - SVI(LVOT): 33.6 ML/M2
BH CV ECHO MEAS - SVI(MOD-SP2): 50.1 ML/M2
BH CV ECHO MEAS - SVI(MOD-SP4): 41.3 ML/M2
BH CV ECHO MEAS - TAPSE (>1.6): 2.05 CM
BH CV ECHO MEAS - TR MAX PG: 14.7 MMHG
BH CV ECHO MEAS - TR MAX VEL: 191.5 CM/SEC
BH CV ECHO MEASUREMENTS AVERAGE E/E' RATIO: 5.93
BH CV IMMEDIATE POST RECOVERY TECH DATA SYMPTOMS: NORMAL
BH CV IMMEDIATE POST TECH DATA BLOOD PRESSURE: NORMAL MMHG
BH CV IMMEDIATE POST TECH DATA HEART RATE: 148 BPM
BH CV IMMEDIATE POST TECH DATA OXYGEN SATS: 94 %
BH CV NINE MINUTE RECOVERY TECH DATA BLOOD PRESSURE: NORMAL MMHG
BH CV NINE MINUTE RECOVERY TECH DATA HEART RATE: 115 BPM
BH CV NINE MINUTE RECOVERY TECH DATA OXYGEN SATURATION: 97 %
BH CV NINE MINUTE RECOVERY TECH DATA SYMPTOMS: NORMAL
BH CV REST NUCLEAR ISOTOPE DOSE: 10.4 MCI
BH CV SIX MINUTE RECOVERY TECH DATA BLOOD PRESSURE: NORMAL
BH CV SIX MINUTE RECOVERY TECH DATA HEART RATE: 115 BPM
BH CV SIX MINUTE RECOVERY TECH DATA OXYGEN SATURATION: 97 %
BH CV SIX MINUTE RECOVERY TECH DATA SYMPTOMS: NORMAL
BH CV STRESS BP STAGE 1: NORMAL
BH CV STRESS BP STAGE 2: NORMAL
BH CV STRESS BP STAGE 3: NORMAL
BH CV STRESS COMMENTS STAGE 1: NORMAL
BH CV STRESS DOSE REGADENOSON STAGE 1: 0.4
BH CV STRESS DURATION MIN STAGE 1: 3
BH CV STRESS DURATION MIN STAGE 2: 3
BH CV STRESS DURATION MIN STAGE 3: 3
BH CV STRESS DURATION SEC STAGE 1: 0
BH CV STRESS DURATION SEC STAGE 2: 0
BH CV STRESS DURATION SEC STAGE 3: 0
BH CV STRESS GRADE STAGE 1: 10
BH CV STRESS GRADE STAGE 2: 12
BH CV STRESS GRADE STAGE 3: 14
BH CV STRESS HR STAGE 1: 120
BH CV STRESS HR STAGE 2: 130
BH CV STRESS HR STAGE 3: 158
BH CV STRESS METS STAGE 1: 5
BH CV STRESS METS STAGE 2: 7.5
BH CV STRESS METS STAGE 3: 10
BH CV STRESS NUCLEAR ISOTOPE DOSE: 38 MCI
BH CV STRESS O2 STAGE 1: 97
BH CV STRESS O2 STAGE 2: 96
BH CV STRESS O2 STAGE 3: 92
BH CV STRESS PROTOCOL 1: NORMAL
BH CV STRESS RECOVERY BP: NORMAL MMHG
BH CV STRESS RECOVERY HR: 115 BPM
BH CV STRESS RECOVERY O2: 96 %
BH CV STRESS SPEED STAGE 1: 1.7
BH CV STRESS SPEED STAGE 2: 2.5
BH CV STRESS SPEED STAGE 3: 3.4
BH CV STRESS STAGE 1: 1
BH CV STRESS STAGE 2: 2
BH CV STRESS STAGE 3: 3
BH CV THREE MINUTE POST TECH DATA BLOOD PRESSURE: NORMAL MMHG
BH CV THREE MINUTE POST TECH DATA HEART RATE: 124 BPM
BH CV THREE MINUTE POST TECH DATA OXYGEN SATURATION: 97 %
BH CV THREE MINUTE RECOVERY TECH DATA SYMPTOM: NORMAL
BH CV XLRA - RV BASE: 5.1 CM
BH CV XLRA - TDI S': 13.1 CM/SEC
LEFT ATRIUM VOLUME INDEX: 27.1 ML/M2
MAXIMAL PREDICTED HEART RATE: 176 BPM
PERCENT MAX PREDICTED HR: 89.77 %
SINUS: 2.9 CM
SPECT HRT GATED+EF W RNC IV: 29 %
STRESS BASELINE BP: NORMAL MMHG
STRESS BASELINE HR: 94 BPM
STRESS O2 SAT REST: 98 %
STRESS PERCENT HR: 106 %
STRESS POST ESTIMATED WORKLOAD: 10.2 METS
STRESS POST EXERCISE DUR MIN: 9 MIN
STRESS POST EXERCISE DUR SEC: 0 SEC
STRESS POST O2 SAT PEAK: 92 %
STRESS POST PEAK BP: NORMAL MMHG
STRESS POST PEAK HR: 158 BPM
STRESS TARGET HR: 150 BPM

## 2025-03-10 PROCEDURE — 78452 HT MUSCLE IMAGE SPECT MULT: CPT | Performed by: INTERNAL MEDICINE

## 2025-03-10 PROCEDURE — 93016 CV STRESS TEST SUPVJ ONLY: CPT | Performed by: NURSE PRACTITIONER

## 2025-03-10 PROCEDURE — 93306 TTE W/DOPPLER COMPLETE: CPT

## 2025-03-10 PROCEDURE — A9502 TC99M TETROFOSMIN: HCPCS | Performed by: FAMILY MEDICINE

## 2025-03-10 PROCEDURE — 93018 CV STRESS TEST I&R ONLY: CPT | Performed by: INTERNAL MEDICINE

## 2025-03-10 PROCEDURE — 93017 CV STRESS TEST TRACING ONLY: CPT

## 2025-03-10 PROCEDURE — 34310000005 TECHNETIUM TETROFOSMIN KIT: Performed by: FAMILY MEDICINE

## 2025-03-10 PROCEDURE — 78452 HT MUSCLE IMAGE SPECT MULT: CPT

## 2025-03-10 RX ADMIN — TETROFOSMIN 1 DOSE: 1.38 INJECTION, POWDER, LYOPHILIZED, FOR SOLUTION INTRAVENOUS at 12:30

## 2025-03-10 RX ADMIN — TETROFOSMIN 1 DOSE: 1.38 INJECTION, POWDER, LYOPHILIZED, FOR SOLUTION INTRAVENOUS at 11:20

## 2025-05-13 ENCOUNTER — OFFICE VISIT (OUTPATIENT)
Dept: CARDIOLOGY | Facility: CLINIC | Age: 45
End: 2025-05-13
Payer: COMMERCIAL

## 2025-05-13 VITALS
HEIGHT: 69 IN | SYSTOLIC BLOOD PRESSURE: 129 MMHG | BODY MASS INDEX: 35.13 KG/M2 | OXYGEN SATURATION: 97 % | HEART RATE: 88 BPM | WEIGHT: 237.2 LBS | DIASTOLIC BLOOD PRESSURE: 69 MMHG

## 2025-05-13 DIAGNOSIS — I50.20 SYSTOLIC CONGESTIVE HEART FAILURE, UNSPECIFIED HF CHRONICITY: Primary | ICD-10-CM

## 2025-05-13 DIAGNOSIS — R94.31 ABNORMAL EKG: ICD-10-CM

## 2025-05-13 DIAGNOSIS — R94.39 ABNORMAL NUCLEAR STRESS TEST: ICD-10-CM

## 2025-05-13 DIAGNOSIS — I51.7 LEFT VENTRICULAR HYPERTROPHY: ICD-10-CM

## 2025-05-13 RX ORDER — SODIUM CHLORIDE 0.9 % (FLUSH) 0.9 %
10 SYRINGE (ML) INJECTION AS NEEDED
OUTPATIENT
Start: 2025-05-13

## 2025-05-13 RX ORDER — ATENOLOL 25 MG/1
25 TABLET ORAL DAILY
Qty: 90 TABLET | Refills: 3 | Status: SHIPPED | OUTPATIENT
Start: 2025-05-13

## 2025-05-13 RX ORDER — SODIUM CHLORIDE 9 MG/ML
40 INJECTION, SOLUTION INTRAVENOUS AS NEEDED
OUTPATIENT
Start: 2025-05-13

## 2025-05-13 RX ORDER — SODIUM CHLORIDE 0.9 % (FLUSH) 0.9 %
10 SYRINGE (ML) INJECTION EVERY 12 HOURS SCHEDULED
OUTPATIENT
Start: 2025-05-13

## 2025-05-13 NOTE — H&P (VIEW-ONLY)
[facility]  INTERVENTIONAL CARDIOLOGY NEW PATIENT OFFICE VISIT      Chief Complaint  Cardiomyopathy, Systolic CHF, HF Chronicity, Left ventricular hypertrophy, BROTHERS, and Establish Care    Subjective          History of Present Illness    Latrell Morales is a 44 y.o. malewho presents to cardiology clinic as a new patient visit.      Patient had history of chronic systolic congestive heart failure suspected to be dilated cardiomyopathy since 2022.  He had a stress test in 2022 echocardiogram which showed ejection fraction of 45% with stress test showing no significant perfusion defect.  Patient was lost to follow-up but had a recent echocardiogram that showed ejection fraction of 45% with mild global hypokinesis.  No significant valvular abnormalities.  He also had a stress test that showed a stress EF of 29% with small apical fixed defect.  Patient has been doing weightlifting for more than 20 years.  He does anabolic steroid for a long time and was previously suggested to quit doing it.  Patient states he has been doing it at low-dose currently.  Patient does at least 5 times a week weightlifting.  He denies chest pain but has mild shortness of breath during exertion.  EKG shows T wave changes in lead III.  Recent echocardiogram shows normal LV chamber size.  There is mild increase wall thickness likely secondary to athletic heart.  Patient is not on any guideline directed medical therapy for systolic dysfunction.    Past History:  Past Medical History:   Diagnosis Date    Abnormal electrocardiogram     Abnormal level of hormones in specimens from other organs, systems and tissues     High risk sexual behavior     Insomnia     Nontoxic goiter     Other specified anxiety disorders        Medical History:  Past Medical History:   Diagnosis Date    Abnormal electrocardiogram     Abnormal level of hormones in specimens from other organs, systems and tissues     High risk sexual behavior     Insomnia     Nontoxic  "goiter     Other specified anxiety disorders        Surgical History:   has a past surgical history that includes Vasectomy; Knee surgery (Left); Esophagogastroduodenoscopy (N/A, 03/06/2023); and Upper gastrointestinal endoscopy.     Family History:   family history includes Coronary artery disease in his maternal grandmother; Heart attack in his paternal grandfather; Heart failure in his maternal grandfather; No Known Problems in his mother and sister; Suicidality in his father.     Social History:   reports that he has never smoked. He has been exposed to tobacco smoke. He has never used smokeless tobacco. He reports current alcohol use. He reports that he does not use drugs.    Allergies:   Adhesive tape, Latex, and Sertraline hcl    Current Outpatient Medications on File Prior to Visit   Medication Sig    gabapentin (NEURONTIN) 400 MG capsule Take 1 capsule by mouth 3 times a day.    HYDROcodone-acetaminophen (NORCO) 5-325 MG per tablet Take 1 tablet by mouth As Needed.    ibuprofen (ADVIL,MOTRIN) 800 MG tablet Take 1 tablet by mouth Every 8 (Eight) Hours As Needed.    pantoprazole (PROTONIX) 40 MG EC tablet TAKE ONE TABLET BY MOUTH EVERY DAY    sildenafil (VIAGRA) 100 MG tablet TAKE 1/2 TO 1 TABLET BY MOUTH ONE HOUR PRIOR TO SEX    [DISCONTINUED] buPROPion XL (WELLBUTRIN XL) 150 MG 24 hr tablet TAKE ONE TABLET BY MOUTH EVERY DAY     No current facility-administered medications on file prior to visit.          Review of Systems   Negative ROS except as mentioned in HPI above.     Objective     /69 (BP Location: Left arm, Patient Position: Sitting, Cuff Size: Large Adult)   Pulse 88   Ht 175.3 cm (69.02\")   Wt 108 kg (237 lb 3.2 oz)   SpO2 97%   BMI 35.01 kg/m²       Physical Exam  General : Alert, awake, no acute distress  Neck : Supple, no carotid bruit, no jugular venous distention  CVS : Regular rate and rhythm, no murmur, rubs or gallops  Lungs: Clear to auscultation bilaterally, no crackles or " rhonchi  Abdomen: Soft, nontender, bowel sounds heard in all 4 quadrants  Extremities: Warm, well-perfused, no pedal edema      Result Review :     The following data was reviewed by: Marko Armenta MD on 05/13/2025:    CMP          1/20/2025    16:54   CMP   Glucose 87    BUN 16    Creatinine 1.49    EGFR 59.0    Sodium 137    Potassium 4.7    Chloride 100    Calcium 9.3    Total Protein 6.8    Albumin 3.8    Globulin 3.0    Total Bilirubin 0.6    Alkaline Phosphatase 38    AST (SGOT) 31    ALT (SGPT) 33    Albumin/Globulin Ratio 1.3    BUN/Creatinine Ratio 10.7    Anion Gap 11.9      CBC          1/20/2025    16:54   CBC   WBC 6.59    RBC 5.30    Hemoglobin 15.5    Hematocrit 48.9    MCV 92.3    MCH 29.2    MCHC 31.7    RDW 12.1    Platelets 299      TSH          1/20/2025    16:54   TSH   TSH 2.160      Lipid Panel          1/20/2025    16:54   Lipid Panel   Total Cholesterol 214    Triglycerides 138    HDL Cholesterol 19    VLDL Cholesterol 26    LDL Cholesterol  169    LDL/HDL Ratio 8.81           Data reviewed: Cardiology studies    Results for orders placed during the hospital encounter of 03/10/25    Adult Transthoracic Echo Complete W/ Cont if Necessary Per Protocol    Interpretation Summary    Left ventricular ejection fraction appears to be 46 - 50%.    Left ventricular wall thickness is consistent with mild to moderate concentric hypertrophy.    Left ventricular diastolic function is consistent with (grade I) impaired relaxation.    Estimated right ventricular systolic pressure from tricuspid regurgitation is normal (<35 mmHg).    Results for orders placed during the hospital encounter of 03/10/25    Stress Test With Myocardial Perfusion One Day    Interpretation Summary    Impressions are consistent with an intermediate risk study.    Left ventricular ejection fraction is severely reduced (Calculated EF = 29%).    Abnormal LV wall motion consistent with global hypokinesis.    Findings are most  consistent with dilated cardiomyopathy    Findings consistent with a normal ECG stress test.    Exercise duration (min) 9 min    Estimated workload 10.2 METS    No results found for this or any previous visit.          Procedures  Echocardiogram from 2022 and 2025 reviewed, mildly reduced systolic function  EKG from 1/2025 reviewed, T wave inversion in lead III is present with sinus rhythm.  Stress test from 2025 reviewed, LVEF during stress has reduced to 29%, with no significant perfusion defect    The ASCVD Risk score (Nancy DK, et al., 2019) failed to calculate for the following reasons:    The valid HDL cholesterol range is 20 to 100 mg/dL         Assessment and Plan      Diagnoses and all orders for this visit:    1. Systolic congestive heart failure, unspecified HF chronicity (Primary)  -     Case Request Cath Lab: Left Heart Catheterization with possible coronary angioplasty; Standing  -     Obtain Informed Consent; Standing  -     Clip Bilateral Groins; Standing  -     Clip Bilateral Arms; Standing  -     Record Actual Height & Weight Prior to Procedure; Standing  -     Sukhwinder Test Prior to Procedure; Standing  -     Notify MD; Standing  -     Notify Provider; Standing  -     CBC (No Diff); Standing  -     Basic Metabolic Panel; Standing  -     Protime-INR; Standing  -     aPTT; Standing  -     No Solid Food or Milk for 6 Hours Prior to Scheduled Arrival Time for Cardiac Catheterization  -     Nothing by Mouth for 2 Hours prior to Scheduled Arrival Time  -     Clear Liquids Allowed and Encouraged up to 2 hours Prior to Scheduled Arrival Time - Including at Least 28 [address]un[place] Within 3-Hour Window Prior to Scheduled Arrival Time  -     Insert Peripheral IV; Standing  -     Saline Lock & Maintain IV Access; Standing  -     sodium chloride 0.9 % flush 10 mL  -     sodium chloride 0.9 % flush 10 mL  -     sodium chloride 0.9 % infusion 40 mL  -     Pre-Procedure IV Hydration Not Needed; Standing  -     Case Request  Cath Lab: Left Heart Catheterization with possible coronary angioplasty    2. Left ventricular hypertrophy  -     Case Request Cath Lab: Left Heart Catheterization with possible coronary angioplasty; Standing  -     Obtain Informed Consent; Standing  -     Clip Bilateral Groins; Standing  -     Clip Bilateral Arms; Standing  -     Record Actual Height & Weight Prior to Procedure; Standing  -     Sukhwinder Test Prior to Procedure; Standing  -     Notify MD; Standing  -     Notify Provider; Standing  -     CBC (No Diff); Standing  -     Basic Metabolic Panel; Standing  -     Protime-INR; Standing  -     aPTT; Standing  -     No Solid Food or Milk for 6 Hours Prior to Scheduled Arrival Time for Cardiac Catheterization  -     Nothing by Mouth for 2 Hours prior to Scheduled Arrival Time  -     Clear Liquids Allowed and Encouraged up to 2 hours Prior to Scheduled Arrival Time - Including at Least 28 [place] Within 3-Hour Window Prior to Scheduled Arrival Time  -     Insert Peripheral IV; Standing  -     Saline Lock & Maintain IV Access; Standing  -     sodium chloride 0.9 % flush 10 mL  -     sodium chloride 0.9 % flush 10 mL  -     sodium chloride 0.9 % infusion 40 mL  -     Pre-Procedure IV Hydration Not Needed; Standing  -     Case Request Cath Lab: Left Heart Catheterization with possible coronary angioplasty    3. Abnormal EKG  -     Case Request Cath Lab: Left Heart Catheterization with possible coronary angioplasty; Standing  -     Obtain Informed Consent; Standing  -     Clip Bilateral Groins; Standing  -     Clip Bilateral Arms; Standing  -     Record Actual Height & Weight Prior to Procedure; Standing  -     Sukhwinder Test Prior to Procedure; Standing  -     Notify MD; Standing  -     Notify Provider; Standing  -     CBC (No Diff); Standing  -     Basic Metabolic Panel; Standing  -     Protime-INR; Standing  -     aPTT; Standing  -     No Solid Food or Milk for 6 Hours Prior to Scheduled Arrival Time for Cardiac  Catheterization  -     Nothing by Mouth for 2 Hours prior to Scheduled Arrival Time  -     Clear Liquids Allowed and Encouraged up to 2 hours Prior to Scheduled Arrival Time - Including at Least 28 [address] Within 3-Hour Window Prior to Scheduled Arrival Time  -     Insert Peripheral IV; Standing  -     Saline Lock & Maintain IV Access; Standing  -     sodium chloride 0.9 % flush 10 mL  -     sodium chloride 0.9 % flush 10 mL  -     sodium chloride 0.9 % infusion 40 mL  -     Pre-Procedure IV Hydration Not Needed; Standing  -     Case Request Cath Lab: Left Heart Catheterization with possible coronary angioplasty    4. Abnormal nuclear stress test  -     Case Request Cath Lab: Left Heart Catheterization with possible coronary angioplasty; Standing  -     Obtain Informed Consent; Standing  -     Clip Bilateral Groins; Standing  -     Clip Bilateral Arms; Standing  -     Record Actual Height & Weight Prior to Procedure; Standing  -     Sukhwinder Test Prior to Procedure; Standing  -     Notify MD; Standing  -     Notify Provider; Standing  -     CBC (No Diff); Standing  -     Basic Metabolic Panel; Standing  -     Protime-INR; Standing  -     aPTT; Standing  -     No Solid Food or Milk for 6 Hours Prior to Scheduled Arrival Time for Cardiac Catheterization  -     Nothing by Mouth for 2 Hours prior to Scheduled Arrival Time  -     Clear Liquids Allowed and Encouraged up to 2 hours Prior to Scheduled Arrival Time - Including at Least 28 [address] Within 3-Hour Window Prior to Scheduled Arrival Time  -     Insert Peripheral IV; Standing  -     Saline Lock & Maintain IV Access; Standing  -     sodium chloride 0.9 % flush 10 mL  -     sodium chloride 0.9 % flush 10 mL  -     sodium chloride 0.9 % infusion 40 mL  -     Pre-Procedure IV Hydration Not Needed; Standing  -     Case Request Cath Lab: Left Heart Catheterization with possible coronary angioplasty    Other orders  -     empagliflozin (Jardiance) 10 MG tablet tablet; Take 1  tablet by mouth Daily.  Dispense: 90 tablet; Refill: 2  -     atenolol (TENORMIN) 25 MG tablet; Take 1 tablet by mouth Daily.  Dispense: 90 tablet; Refill: 3        Plan  - Patient with chronic systolic congestive heart failure, suspected dilated cardiomyopathy however recent echocardiogram does not show LV dilatation.  Echocardiogram shows EF of 45% however stress EF during his SPECT shows 29% which is abnormal.  No significant perfusion defect other than small fixed apical defect was seen.  Left heart cath will be suggested to rule out ischemic cause of cardiomyopathy due to reduction in LVEF during stress and to rule out balanced ischemia.  Patient suggested to quit steroids.  Will start patient on atenolol and Jardiance.  Patient suggested to avoid NSAIDs.  - Will follow-up in 6 months or sooner if needed.    Patient was educated on cardiac diet, adequate exercise and achieving/maintaining optimal weight.    Latrell Morales  reports that he has never smoked. He has been exposed to tobacco smoke. He has never used smokeless tobacco.          Follow Up     Return in about 6 months (around 11/13/2025) for Next scheduled follow up.         I spent 60 minutes caring for this patient on this date of service. This time includes time spent by me in the following activities: preparing for the visit, reviewing tests, obtaining and/or reviewing a separately obtained history, performing a medically appropriate examination and/or evaluation , counseling and educating the patient/family/caregiver, ordering medications, tests, or procedures, referring and communicating with other health care professionals , documenting information in the medical record, independently interpreting results and communicating that information with the patient/family/caregiver, and/or care coordination.    I have reviewed the family history, social history, and past medical history for this patient. Previous information and data has been reviewed and  updated as needed. I have reviewed and verified the chief complaint, history, and other documentation. The patient was interviewed and examined in the clinic and the chart reviewed. The previous observations, recommendations, and conclusions were reviewed including those of other providers.     The plan was discussed with the patient and/or family. The patient was given time to ask questions and these questions were answered. At the conclusion of their visit they had no additional questions or concerns and all questions were answered to their satisfaction.     Patient was given instructions and counseling regarding her condition or for health maintenance advice. Please see specific information pulled into the AVS if appropriate.      Marko Armenta MD, FACC  05/13/25  14:37 EDT    Dictated Utilizing Dragon Dictation

## 2025-05-13 NOTE — PROGRESS NOTES
[facility]  INTERVENTIONAL CARDIOLOGY NEW PATIENT OFFICE VISIT      Chief Complaint  Cardiomyopathy, Systolic CHF, HF Chronicity, Left ventricular hypertrophy, BROTHERS, and Establish Care    Subjective          History of Present Illness    Latrell Morales is a 44 y.o. malewho presents to cardiology clinic as a new patient visit.      Patient had history of chronic systolic congestive heart failure suspected to be dilated cardiomyopathy since 2022.  He had a stress test in 2022 echocardiogram which showed ejection fraction of 45% with stress test showing no significant perfusion defect.  Patient was lost to follow-up but had a recent echocardiogram that showed ejection fraction of 45% with mild global hypokinesis.  No significant valvular abnormalities.  He also had a stress test that showed a stress EF of 29% with small apical fixed defect.  Patient has been doing weightlifting for more than 20 years.  He does anabolic steroid for a long time and was previously suggested to quit doing it.  Patient states he has been doing it at low-dose currently.  Patient does at least 5 times a week weightlifting.  He denies chest pain but has mild shortness of breath during exertion.  EKG shows T wave changes in lead III.  Recent echocardiogram shows normal LV chamber size.  There is mild increase wall thickness likely secondary to athletic heart.  Patient is not on any guideline directed medical therapy for systolic dysfunction.    Past History:  Past Medical History:   Diagnosis Date    Abnormal electrocardiogram     Abnormal level of hormones in specimens from other organs, systems and tissues     High risk sexual behavior     Insomnia     Nontoxic goiter     Other specified anxiety disorders        Medical History:  Past Medical History:   Diagnosis Date    Abnormal electrocardiogram     Abnormal level of hormones in specimens from other organs, systems and tissues     High risk sexual behavior     Insomnia     Nontoxic  "goiter     Other specified anxiety disorders        Surgical History:   has a past surgical history that includes Vasectomy; Knee surgery (Left); Esophagogastroduodenoscopy (N/A, 03/06/2023); and Upper gastrointestinal endoscopy.     Family History:   family history includes Coronary artery disease in his maternal grandmother; Heart attack in his paternal grandfather; Heart failure in his maternal grandfather; No Known Problems in his mother and sister; Suicidality in his father.     Social History:   reports that he has never smoked. He has been exposed to tobacco smoke. He has never used smokeless tobacco. He reports current alcohol use. He reports that he does not use drugs.    Allergies:   Adhesive tape, Latex, and Sertraline hcl    Current Outpatient Medications on File Prior to Visit   Medication Sig    gabapentin (NEURONTIN) 400 MG capsule Take 1 capsule by mouth 3 times a day.    HYDROcodone-acetaminophen (NORCO) 5-325 MG per tablet Take 1 tablet by mouth As Needed.    ibuprofen (ADVIL,MOTRIN) 800 MG tablet Take 1 tablet by mouth Every 8 (Eight) Hours As Needed.    pantoprazole (PROTONIX) 40 MG EC tablet TAKE ONE TABLET BY MOUTH EVERY DAY    sildenafil (VIAGRA) 100 MG tablet TAKE 1/2 TO 1 TABLET BY MOUTH ONE HOUR PRIOR TO SEX    [DISCONTINUED] buPROPion XL (WELLBUTRIN XL) 150 MG 24 hr tablet TAKE ONE TABLET BY MOUTH EVERY DAY     No current facility-administered medications on file prior to visit.          Review of Systems   Negative ROS except as mentioned in HPI above.     Objective     /69 (BP Location: Left arm, Patient Position: Sitting, Cuff Size: Large Adult)   Pulse 88   Ht 175.3 cm (69.02\")   Wt 108 kg (237 lb 3.2 oz)   SpO2 97%   BMI 35.01 kg/m²       Physical Exam  General : Alert, awake, no acute distress  Neck : Supple, no carotid bruit, no jugular venous distention  CVS : Regular rate and rhythm, no murmur, rubs or gallops  Lungs: Clear to auscultation bilaterally, no crackles or " rhonchi  Abdomen: Soft, nontender, bowel sounds heard in all 4 quadrants  Extremities: Warm, well-perfused, no pedal edema      Result Review :     The following data was reviewed by: Marko Armenta MD on 05/13/2025:    CMP          1/20/2025    16:54   CMP   Glucose 87    BUN 16    Creatinine 1.49    EGFR 59.0    Sodium 137    Potassium 4.7    Chloride 100    Calcium 9.3    Total Protein 6.8    Albumin 3.8    Globulin 3.0    Total Bilirubin 0.6    Alkaline Phosphatase 38    AST (SGOT) 31    ALT (SGPT) 33    Albumin/Globulin Ratio 1.3    BUN/Creatinine Ratio 10.7    Anion Gap 11.9      CBC          1/20/2025    16:54   CBC   WBC 6.59    RBC 5.30    Hemoglobin 15.5    Hematocrit 48.9    MCV 92.3    MCH 29.2    MCHC 31.7    RDW 12.1    Platelets 299      TSH          1/20/2025    16:54   TSH   TSH 2.160      Lipid Panel          1/20/2025    16:54   Lipid Panel   Total Cholesterol 214    Triglycerides 138    HDL Cholesterol 19    VLDL Cholesterol 26    LDL Cholesterol  169    LDL/HDL Ratio 8.81           Data reviewed: Cardiology studies    Results for orders placed during the hospital encounter of 03/10/25    Adult Transthoracic Echo Complete W/ Cont if Necessary Per Protocol    Interpretation Summary    Left ventricular ejection fraction appears to be 46 - 50%.    Left ventricular wall thickness is consistent with mild to moderate concentric hypertrophy.    Left ventricular diastolic function is consistent with (grade I) impaired relaxation.    Estimated right ventricular systolic pressure from tricuspid regurgitation is normal (<35 mmHg).    Results for orders placed during the hospital encounter of 03/10/25    Stress Test With Myocardial Perfusion One Day    Interpretation Summary    Impressions are consistent with an intermediate risk study.    Left ventricular ejection fraction is severely reduced (Calculated EF = 29%).    Abnormal LV wall motion consistent with global hypokinesis.    Findings are most  consistent with dilated cardiomyopathy    Findings consistent with a normal ECG stress test.    Exercise duration (min) 9 min    Estimated workload 10.2 METS    No results found for this or any previous visit.          Procedures  Echocardiogram from 2022 and 2025 reviewed, mildly reduced systolic function  EKG from 1/2025 reviewed, T wave inversion in lead III is present with sinus rhythm.  Stress test from 2025 reviewed, LVEF during stress has reduced to 29%, with no significant perfusion defect    The ASCVD Risk score (Nancy DK, et al., 2019) failed to calculate for the following reasons:    The valid HDL cholesterol range is 20 to 100 mg/dL         Assessment and Plan      Diagnoses and all orders for this visit:    1. Systolic congestive heart failure, unspecified HF chronicity (Primary)  -     Case Request Cath Lab: Left Heart Catheterization with possible coronary angioplasty; Standing  -     Obtain Informed Consent; Standing  -     Clip Bilateral Groins; Standing  -     Clip Bilateral Arms; Standing  -     Record Actual Height & Weight Prior to Procedure; Standing  -     Sukhwinder Test Prior to Procedure; Standing  -     Notify MD; Standing  -     Notify Provider; Standing  -     CBC (No Diff); Standing  -     Basic Metabolic Panel; Standing  -     Protime-INR; Standing  -     aPTT; Standing  -     No Solid Food or Milk for 6 Hours Prior to Scheduled Arrival Time for Cardiac Catheterization  -     Nothing by Mouth for 2 Hours prior to Scheduled Arrival Time  -     Clear Liquids Allowed and Encouraged up to 2 hours Prior to Scheduled Arrival Time - Including at Least 28 Ounces Within 3-Hour Window Prior to Scheduled Arrival Time  -     Insert Peripheral IV; Standing  -     Saline Lock & Maintain IV Access; Standing  -     sodium chloride 0.9 % flush 10 mL  -     sodium chloride 0.9 % flush 10 mL  -     sodium chloride 0.9 % infusion 40 mL  -     Pre-Procedure IV Hydration Not Needed; Standing  -     Case Request  Cath Lab: Left Heart Catheterization with possible coronary angioplasty    2. Left ventricular hypertrophy  -     Case Request Cath Lab: Left Heart Catheterization with possible coronary angioplasty; Standing  -     Obtain Informed Consent; Standing  -     Clip Bilateral Groins; Standing  -     Clip Bilateral Arms; Standing  -     Record Actual Height & Weight Prior to Procedure; Standing  -     Sukhwinder Test Prior to Procedure; Standing  -     Notify MD; Standing  -     Notify Provider; Standing  -     CBC (No Diff); Standing  -     Basic Metabolic Panel; Standing  -     Protime-INR; Standing  -     aPTT; Standing  -     No Solid Food or Milk for 6 Hours Prior to Scheduled Arrival Time for Cardiac Catheterization  -     Nothing by Mouth for 2 Hours prior to Scheduled Arrival Time  -     Clear Liquids Allowed and Encouraged up to 2 hours Prior to Scheduled Arrival Time - Including at Least 28 [address] Within 3-Hour Window Prior to Scheduled Arrival Time  -     Insert Peripheral IV; Standing  -     Saline Lock & Maintain IV Access; Standing  -     sodium chloride 0.9 % flush 10 mL  -     sodium chloride 0.9 % flush 10 mL  -     sodium chloride 0.9 % infusion 40 mL  -     Pre-Procedure IV Hydration Not Needed; Standing  -     Case Request Cath Lab: Left Heart Catheterization with possible coronary angioplasty    3. Abnormal EKG  -     Case Request Cath Lab: Left Heart Catheterization with possible coronary angioplasty; Standing  -     Obtain Informed Consent; Standing  -     Clip Bilateral Groins; Standing  -     Clip Bilateral Arms; Standing  -     Record Actual Height & Weight Prior to Procedure; Standing  -     Sukhwinder Test Prior to Procedure; Standing  -     Notify MD; Standing  -     Notify Provider; Standing  -     CBC (No Diff); Standing  -     Basic Metabolic Panel; Standing  -     Protime-INR; Standing  -     aPTT; Standing  -     No Solid Food or Milk for 6 Hours Prior to Scheduled Arrival Time for Cardiac  Catheterization  -     Nothing by Mouth for 2 Hours prior to Scheduled Arrival Time  -     Clear Liquids Allowed and Encouraged up to 2 hours Prior to Scheduled Arrival Time - Including at Least 28 [address] Within 3-Hour Window Prior to Scheduled Arrival Time  -     Insert Peripheral IV; Standing  -     Saline Lock & Maintain IV Access; Standing  -     sodium chloride 0.9 % flush 10 mL  -     sodium chloride 0.9 % flush 10 mL  -     sodium chloride 0.9 % infusion 40 mL  -     Pre-Procedure IV Hydration Not Needed; Standing  -     Case Request Cath Lab: Left Heart Catheterization with possible coronary angioplasty    4. Abnormal nuclear stress test  -     Case Request Cath Lab: Left Heart Catheterization with possible coronary angioplasty; Standing  -     Obtain Informed Consent; Standing  -     Clip Bilateral Groins; Standing  -     Clip Bilateral Arms; Standing  -     Record Actual Height & Weight Prior to Procedure; Standing  -     Sukhwinder Test Prior to Procedure; Standing  -     Notify MD; Standing  -     Notify Provider; Standing  -     CBC (No Diff); Standing  -     Basic Metabolic Panel; Standing  -     Protime-INR; Standing  -     aPTT; Standing  -     No Solid Food or Milk for 6 Hours Prior to Scheduled Arrival Time for Cardiac Catheterization  -     Nothing by Mouth for 2 Hours prior to Scheduled Arrival Time  -     Clear Liquids Allowed and Encouraged up to 2 hours Prior to Scheduled Arrival Time - Including at Least 28 [address]un[place] Within 3-Hour Window Prior to Scheduled Arrival Time  -     Insert Peripheral IV; Standing  -     Saline Lock & Maintain IV Access; Standing  -     sodium chloride 0.9 % flush 10 mL  -     sodium chloride 0.9 % flush 10 mL  -     sodium chloride 0.9 % infusion 40 mL  -     Pre-Procedure IV Hydration Not Needed; Standing  -     Case Request Cath Lab: Left Heart Catheterization with possible coronary angioplasty    Other orders  -     empagliflozin (Jardiance) 10 MG tablet tablet; Take 1  tablet by mouth Daily.  Dispense: 90 tablet; Refill: 2  -     atenolol (TENORMIN) 25 MG tablet; Take 1 tablet by mouth Daily.  Dispense: 90 tablet; Refill: 3        Plan  - Patient with chronic systolic congestive heart failure, suspected dilated cardiomyopathy however recent echocardiogram does not show LV dilatation.  Echocardiogram shows EF of 45% however stress EF during his SPECT shows 29% which is abnormal.  No significant perfusion defect other than small fixed apical defect was seen.  Left heart cath will be suggested to rule out ischemic cause of cardiomyopathy due to reduction in LVEF during stress and to rule out balanced ischemia.  Patient suggested to quit steroids.  Will start patient on atenolol and Jardiance.  Patient suggested to avoid NSAIDs.  - Will follow-up in 6 months or sooner if needed.    Patient was educated on cardiac diet, adequate exercise and achieving/maintaining optimal weight.    Latrell Morales  reports that he has never smoked. He has been exposed to tobacco smoke. He has never used smokeless tobacco.          Follow Up     Return in about 6 months (around 11/13/2025) for Next scheduled follow up.         I spent 60 minutes caring for this patient on this date of service. This time includes time spent by me in the following activities: preparing for the visit, reviewing tests, obtaining and/or reviewing a separately obtained history, performing a medically appropriate examination and/or evaluation , counseling and educating the patient/family/caregiver, ordering medications, tests, or procedures, referring and communicating with other health care professionals , documenting information in the medical record, independently interpreting results and communicating that information with the patient/family/caregiver, and/or care coordination.    I have reviewed the family history, social history, and past medical history for this patient. Previous information and data has been reviewed and  updated as needed. I have reviewed and verified the chief complaint, history, and other documentation. The patient was interviewed and examined in the clinic and the chart reviewed. The previous observations, recommendations, and conclusions were reviewed including those of other providers.     The plan was discussed with the patient and/or family. The patient was given time to ask questions and these questions were answered. At the conclusion of their visit they had no additional questions or concerns and all questions were answered to their satisfaction.     Patient was given instructions and counseling regarding her condition or for health maintenance advice. Please see specific information pulled into the AVS if appropriate.      Marko Armenta MD, FACC  05/13/25  14:37 EDT    Dictated Utilizing Dragon Dictation

## 2025-05-13 NOTE — LETTER
May 13, 2025     Raffi Pierson MD  [address]  [address]  [facility] [address]    Patient: Latrell Morales   YOB: 1980   Date of Visit: 5/13/2025       Dear Raffi Pierson MD,    Latrell Morales was in my office today. Below are the relevant portions of my assessment and plan of care.           If you have questions, please do not hesitate to call me. I look forward to following Latrell along with you.         Sincerely,        Marko Armenta MD        CC: No Recipients

## 2025-06-04 ENCOUNTER — TELEPHONE (OUTPATIENT)
Dept: CARDIOLOGY | Facility: CLINIC | Age: 45
End: 2025-06-04
Payer: COMMERCIAL

## 2025-06-04 NOTE — TELEPHONE ENCOUNTER
I spoke to patient and gave an appointment on 06/12/25 for [facility]. Patient was instructed to have a  for the day of the procedure and to arrive at the main entrance registration area in the [facility]. Patient was educated about stent placement and informed that in the event of stent placement, the patient will be required to stay overnight for observation. Patient was instructed to continue all medications as usual. Patient was instructed to have no solid food or milk for 6 hours prior to scheduled arrival time, clear liquids only for 6 hours prior up to 2 hours prior to scheduled arrival time, NPO for 2 hours prior to scheduled arrival time. Patient was instructed to have labs completed on the morning of the procedure. Patient was advised surgery scheduling department will call the afternoon before the procedure to provide an arrival time. Patient is agreeable with no other questions or concerns.

## 2025-06-11 NOTE — PRE-PROCEDURE INSTRUCTIONS
PATIENT INSTRUCTED TO BE:    - PATIENT INSTRUCTED NOTHING TO EAT/ NO SOLID FOOD OR MILK FOR 6 HOURS PRIOR TO SCHEDULED ARRIVAL TIME.     - MAY HAVE CLEAR LIQUIDS ONLY UP TO 2 HOURS PRIOR TO SCHEDULED ARRIVAL TIME, EXCEPT CAN HAVE SIPS OF WATER WITH MEDICATIONS PRIOR TO PROCEDURE    -  INSTRUCTED NO LOTIONS, JEWELRY, PIERCINGS, OR DEODORANT DAY OF THE PROCEDURE    - INSTRUCTED TO TAKE THE FOLLOWING MEDICATIONS THE DAY OF SURGERY:       Jardiance, Atenolol, Protonix    - INSTRUCTED PT TO FOLLOW ANY INSTRUCTIONS GIVEN BY HIS CARDIOLOGIST.    - INFORMED PT THEY ATTEMPT RADIAL APPROACH FIRST IF UNABLE TO PERFORM CATH WITH THAT APPROACH THEY WILL DO A FEMORAL APPROACH.  ALSO, INFORMED PT THEY WILL BE ON BEDREST POSTOP.  IF THE SURGEON FEELS  AN INTERVENTION OR STENTS NEEDS TO BE PLACED, HE/SHE WILL STAY OVER NIGHT FOR OBSERVATION AND MONITORING.     - INFORMED THE PATIENT HE CAN HAVE TWO VISITORS WITH HIM THE DAY OF THE PROCEDURE    - INSTRUCTED PT TO COME TO [facility] ( [address]), CAN  PARK OR SELF PARK. ENTER THE PAVILION THRU MAIN ENTRANCE, TAKE ELEVATORS TO THE FIRST FLOOR, CHECK IN AT THE DESK FOR REGISTRATION, AFTER REGISTRATION PT WILL BE TAKEN THE THE PREOP AREA FOR HIS OR HER PROCEDURE.    -ARRIVAL TIME GIVEN BY SAME DAY SURGERY, YOU WILL RECEIVE A PHONE CALL BETWEEN 1PM AND 4PM, INFORMED PT IF ARRIVAL TIME CHANGES OR ADJUSTMENTS NEED TO BE MADE IN THEIR ARRIVAL TIME, HE/SHE WOULD RECEIVE A CALL.    -INSTRUCTED PT TO COME TO [facility] TO GET THEIR LABS/ EKG DONE PRIOR TO PROCEDURE      - PATIENT VERBALIZED UNDERSTANDING

## 2025-06-12 ENCOUNTER — HOSPITAL ENCOUNTER (OUTPATIENT)
Facility: HOSPITAL | Age: 45
Setting detail: HOSPITAL OUTPATIENT SURGERY
Discharge: HOME | End: 2025-06-12
Attending: INTERNAL MEDICINE | Admitting: INTERNAL MEDICINE
Payer: COMMERCIAL

## 2025-06-12 VITALS
RESPIRATION RATE: 14 BRPM | BODY MASS INDEX: 33.77 KG/M2 | OXYGEN SATURATION: 97 % | HEIGHT: 70 IN | DIASTOLIC BLOOD PRESSURE: 75 MMHG | WEIGHT: 235.89 LBS | SYSTOLIC BLOOD PRESSURE: 126 MMHG | TEMPERATURE: 98.3 F | HEART RATE: 76 BPM

## 2025-06-12 DIAGNOSIS — R94.31 ABNORMAL EKG: ICD-10-CM

## 2025-06-12 DIAGNOSIS — R94.39 ABNORMAL NUCLEAR STRESS TEST: ICD-10-CM

## 2025-06-12 DIAGNOSIS — I51.7 LEFT VENTRICULAR HYPERTROPHY: ICD-10-CM

## 2025-06-12 DIAGNOSIS — I50.20 SYSTOLIC CONGESTIVE HEART FAILURE, UNSPECIFIED HF CHRONICITY: ICD-10-CM

## 2025-06-12 LAB
ANION GAP SERPL CALCULATED.3IONS-SCNC: 7.4 MMOL/L (ref 5–15)
APTT PPP: 26.1 SECONDS (ref 24.2–34.2)
BUN SERPL-MCNC: 13.1 MG/DL (ref 6–20)
BUN/CREAT SERPL: 11.2 (ref 7–25)
CALCIUM SPEC-SCNC: 9 MG/DL (ref 8.6–10.5)
CHLORIDE SERPL-SCNC: 106 MMOL/L (ref 98–107)
CO2 SERPL-SCNC: 24.6 MMOL/L (ref 22–29)
CREAT SERPL-MCNC: 1.17 MG/DL (ref 0.76–1.27)
DEPRECATED RDW RBC AUTO: 45.4 FL (ref 37–54)
EGFRCR SERPLBLD CKD-EPI 2021: 78.8 ML/MIN/1.73
ERYTHROCYTE [DISTWIDTH] IN BLOOD BY AUTOMATED COUNT: 14 % (ref 12.3–15.4)
GLUCOSE SERPL-MCNC: 86 MG/DL (ref 65–99)
HCT VFR BLD AUTO: 45.3 % (ref 37.5–51)
HGB BLD-MCNC: 15.4 G/DL (ref 13–17.7)
INR PPP: 1.03 (ref 0.86–1.15)
MCH RBC QN AUTO: 30.1 PG (ref 26.6–33)
MCHC RBC AUTO-ENTMCNC: 34 G/DL (ref 31.5–35.7)
MCV RBC AUTO: 88.5 FL (ref 79–97)
PLATELET # BLD AUTO: 203 10*3/MM3 (ref 140–450)
PMV BLD AUTO: 9.2 FL (ref 6–12)
POTASSIUM SERPL-SCNC: 4.3 MMOL/L (ref 3.5–5.2)
PROTHROMBIN TIME: 13.9 SECONDS (ref 11.8–14.9)
RBC # BLD AUTO: 5.12 10*6/MM3 (ref 4.14–5.8)
SODIUM SERPL-SCNC: 138 MMOL/L (ref 136–145)
WBC NRBC COR # BLD AUTO: 6.28 10*3/MM3 (ref 3.4–10.8)

## 2025-06-12 PROCEDURE — 93458 L HRT ARTERY/VENTRICLE ANGIO: CPT | Performed by: INTERNAL MEDICINE

## 2025-06-12 PROCEDURE — 85610 PROTHROMBIN TIME: CPT | Performed by: INTERNAL MEDICINE

## 2025-06-12 PROCEDURE — 25010000002 FENTANYL CITRATE (PF) 50 MCG/ML SOLUTION: Performed by: INTERNAL MEDICINE

## 2025-06-12 PROCEDURE — C1894 INTRO/SHEATH, NON-LASER: HCPCS | Performed by: INTERNAL MEDICINE

## 2025-06-12 PROCEDURE — 25510000001 IOPAMIDOL PER 1 ML: Performed by: INTERNAL MEDICINE

## 2025-06-12 PROCEDURE — 25010000002 LIDOCAINE 2% SOLUTION: Performed by: INTERNAL MEDICINE

## 2025-06-12 PROCEDURE — 25010000002 MIDAZOLAM PER 1MG: Performed by: INTERNAL MEDICINE

## 2025-06-12 PROCEDURE — 80048 BASIC METABOLIC PNL TOTAL CA: CPT | Performed by: INTERNAL MEDICINE

## 2025-06-12 PROCEDURE — 85730 THROMBOPLASTIN TIME PARTIAL: CPT | Performed by: INTERNAL MEDICINE

## 2025-06-12 PROCEDURE — 25010000002 DIPHENHYDRAMINE PER 50 MG: Performed by: INTERNAL MEDICINE

## 2025-06-12 PROCEDURE — 25010000002 HEPARIN (PORCINE) PER 1000 UNITS: Performed by: INTERNAL MEDICINE

## 2025-06-12 PROCEDURE — 85027 COMPLETE CBC AUTOMATED: CPT | Performed by: INTERNAL MEDICINE

## 2025-06-12 PROCEDURE — C1769 GUIDE WIRE: HCPCS | Performed by: INTERNAL MEDICINE

## 2025-06-12 PROCEDURE — 99152 MOD SED SAME PHYS/QHP 5/>YRS: CPT | Performed by: INTERNAL MEDICINE

## 2025-06-12 RX ORDER — VERAPAMIL HYDROCHLORIDE 2.5 MG/ML
INJECTION INTRAVENOUS
Status: DISCONTINUED | OUTPATIENT
Start: 2025-06-12 | End: 2025-06-12 | Stop reason: HOSPADM

## 2025-06-12 RX ORDER — SODIUM CHLORIDE 9 MG/ML
40 INJECTION, SOLUTION INTRAVENOUS AS NEEDED
Status: DISCONTINUED | OUTPATIENT
Start: 2025-06-12 | End: 2025-06-12 | Stop reason: HOSPADM

## 2025-06-12 RX ORDER — SODIUM CHLORIDE 0.9 % (FLUSH) 0.9 %
10 SYRINGE (ML) INJECTION EVERY 12 HOURS SCHEDULED
Status: DISCONTINUED | OUTPATIENT
Start: 2025-06-12 | End: 2025-06-12 | Stop reason: HOSPADM

## 2025-06-12 RX ORDER — HEPARIN SODIUM 1000 [USP'U]/ML
INJECTION, SOLUTION INTRAVENOUS; SUBCUTANEOUS
Status: DISCONTINUED | OUTPATIENT
Start: 2025-06-12 | End: 2025-06-12 | Stop reason: HOSPADM

## 2025-06-12 RX ORDER — SODIUM CHLORIDE 0.9 % (FLUSH) 0.9 %
10 SYRINGE (ML) INJECTION AS NEEDED
Status: DISCONTINUED | OUTPATIENT
Start: 2025-06-12 | End: 2025-06-12 | Stop reason: HOSPADM

## 2025-06-12 RX ORDER — MIDAZOLAM HYDROCHLORIDE 2 MG/2ML
INJECTION, SOLUTION INTRAMUSCULAR; INTRAVENOUS
Status: DISCONTINUED | OUTPATIENT
Start: 2025-06-12 | End: 2025-06-12 | Stop reason: HOSPADM

## 2025-06-12 RX ORDER — FENTANYL CITRATE 50 UG/ML
INJECTION, SOLUTION INTRAMUSCULAR; INTRAVENOUS
Status: DISCONTINUED | OUTPATIENT
Start: 2025-06-12 | End: 2025-06-12 | Stop reason: HOSPADM

## 2025-06-12 RX ORDER — SODIUM CHLORIDE 9 MG/ML
1 INJECTION, SOLUTION INTRAVENOUS CONTINUOUS
Status: DISCONTINUED | OUTPATIENT
Start: 2025-06-12 | End: 2025-06-12 | Stop reason: HOSPADM

## 2025-06-12 RX ORDER — DIPHENHYDRAMINE HYDROCHLORIDE 50 MG/ML
INJECTION, SOLUTION INTRAMUSCULAR; INTRAVENOUS
Status: DISCONTINUED | OUTPATIENT
Start: 2025-06-12 | End: 2025-06-12 | Stop reason: HOSPADM

## 2025-06-12 RX ORDER — ACETAMINOPHEN 325 MG/1
650 TABLET ORAL EVERY 4 HOURS PRN
Status: DISCONTINUED | OUTPATIENT
Start: 2025-06-12 | End: 2025-06-12 | Stop reason: HOSPADM

## 2025-06-12 RX ORDER — IOPAMIDOL 755 MG/ML
INJECTION, SOLUTION INTRAVASCULAR
Status: DISCONTINUED | OUTPATIENT
Start: 2025-06-12 | End: 2025-06-12 | Stop reason: HOSPADM

## 2025-06-12 RX ORDER — NITROGLYCERIN 0.4 MG/1
0.4 TABLET SUBLINGUAL
Status: DISCONTINUED | OUTPATIENT
Start: 2025-06-12 | End: 2025-06-12 | Stop reason: HOSPADM

## 2025-06-12 RX ORDER — LIDOCAINE HYDROCHLORIDE 20 MG/ML
INJECTION, SOLUTION INFILTRATION; PERINEURAL
Status: DISCONTINUED | OUTPATIENT
Start: 2025-06-12 | End: 2025-06-12 | Stop reason: HOSPADM

## 2025-06-12 NOTE — DISCHARGE INSTRUCTIONS
DISCHARGE INSTRUCTIONS  VASCULAR  PROCEDURES  TR BAND      For your surgery you had: IV sedation.  You may experience dizziness, drowsiness, or light-headedness for several hours following surgery/procedure.  Do not stay alone today or tonight.  Limit your activity for 24 hours.  You should not drive or operate machinery, drink alcohol, or sign legally binding documents for 24 hours or while you are taking pain medication.        NOTIFY YOUR DOCTOR IF YOU EXPERIENCE ANY OF THE FOLLOWING Signs/Symptoms of infection:  Temperature greater than 101 degrees Fahrenheit  Shaking Chills  Redness or excessive drainage from incision  Nausea, vomiting and/or pain that is not controlled by prescribed medications  Increase in bleeding, bleeding that is excessive or fluid  Redness, swelling, or pain.  Warmth.  Pus or a bad smell.  If unable to reach your doctor, please go to the closest Emergency Room       Medications per physicians' instructions as indicated on After Visit Summary.   [] Avoid manipulation of the wrist for 24 hours  Keep affected arm elevated and rested for 48 hours.  Do not flex or bend the affected arm.  Do not push or pull heavy objects with the affected arm.  Do not operate machinery or power tools.  Do not lift anything that is heavier than 10 lb. (4.5 kg), or the limit that you are told, until your health care provider says that it is safe.  May remove Band-Aid in 24 hours  After the dressing is removed, clean gently with soap and water, apply new dressing.   Do not apply powder or lotion to the site  Avoid submerging site for one week or until healed.  No baths, swim, or use a hot tub until your health care provider approves  You may shower 24-48 hours after the procedure or per health care provider.    SUMMARY  After the procedure, it is common to have bruising and tenderness at the incision site.  Get help right away if the incision area swells very fast, you have bleeding at the incision site that  will not stop, or your arm or hand becomes pale, cool, or numb.

## 2025-06-12 NOTE — INTERVAL H&P NOTE
H&P updated. The patient was examined and the following changes are noted:  Patient not taking newly added medication: jardiance and atenolol

## 2025-07-19 ENCOUNTER — PATIENT MESSAGE (OUTPATIENT)
Dept: FAMILY MEDICINE CLINIC | Age: 45
End: 2025-07-19
Payer: COMMERCIAL

## 2025-07-19 DIAGNOSIS — I50.22 CHRONIC SYSTOLIC CONGESTIVE HEART FAILURE: Primary | ICD-10-CM

## 2025-07-19 DIAGNOSIS — I51.7 LEFT VENTRICULAR HYPERTROPHY: ICD-10-CM

## 2025-07-19 DIAGNOSIS — R79.89 ELEVATED SERUM CREATININE: ICD-10-CM

## 2025-07-19 DIAGNOSIS — Z79.899 OTHER LONG TERM (CURRENT) DRUG THERAPY: ICD-10-CM

## 2025-07-21 ENCOUNTER — LAB (OUTPATIENT)
Dept: LAB | Facility: HOSPITAL | Age: 45
End: 2025-07-21
Payer: COMMERCIAL

## 2025-07-21 DIAGNOSIS — I50.22 CHRONIC SYSTOLIC CONGESTIVE HEART FAILURE: ICD-10-CM

## 2025-07-21 DIAGNOSIS — Z79.899 OTHER LONG TERM (CURRENT) DRUG THERAPY: ICD-10-CM

## 2025-07-21 DIAGNOSIS — E78.2 MIXED HYPERLIPIDEMIA: ICD-10-CM

## 2025-07-21 DIAGNOSIS — R79.89 ELEVATED SERUM CREATININE: ICD-10-CM

## 2025-07-21 DIAGNOSIS — I51.7 LEFT VENTRICULAR HYPERTROPHY: ICD-10-CM

## 2025-07-21 LAB
ALBUMIN SERPL-MCNC: 4.2 G/DL (ref 3.5–5.2)
ALBUMIN/GLOB SERPL: 1.6 G/DL
ALP SERPL-CCNC: 40 U/L (ref 39–117)
ALT SERPL W P-5'-P-CCNC: 40 U/L (ref 1–41)
ANION GAP SERPL CALCULATED.3IONS-SCNC: 13.3 MMOL/L (ref 5–15)
AST SERPL-CCNC: 33 U/L (ref 1–40)
BACTERIA UR QL AUTO: NORMAL /HPF
BASOPHILS # BLD AUTO: 0.02 10*3/MM3 (ref 0–0.2)
BASOPHILS NFR BLD AUTO: 0.6 % (ref 0–1.5)
BILIRUB SERPL-MCNC: 0.5 MG/DL (ref 0–1.2)
BILIRUB UR QL STRIP: NEGATIVE
BUN SERPL-MCNC: 18 MG/DL (ref 6–20)
BUN/CREAT SERPL: 12.7 (ref 7–25)
CALCIUM SPEC-SCNC: 9.4 MG/DL (ref 8.6–10.5)
CHLORIDE SERPL-SCNC: 105 MMOL/L (ref 98–107)
CLARITY UR: CLEAR
CO2 SERPL-SCNC: 20.7 MMOL/L (ref 22–29)
COLOR UR: YELLOW
CREAT SERPL-MCNC: 1.42 MG/DL (ref 0.76–1.27)
DEPRECATED RDW RBC AUTO: 46.5 FL (ref 37–54)
EGFRCR SERPLBLD CKD-EPI 2021: 62.5 ML/MIN/1.73
EOSINOPHIL # BLD AUTO: 0.15 10*3/MM3 (ref 0–0.4)
EOSINOPHIL NFR BLD AUTO: 4.4 % (ref 0.3–6.2)
ERYTHROCYTE [DISTWIDTH] IN BLOOD BY AUTOMATED COUNT: 13.9 % (ref 12.3–15.4)
GLOBULIN UR ELPH-MCNC: 2.6 GM/DL
GLUCOSE SERPL-MCNC: 97 MG/DL (ref 65–99)
GLUCOSE UR STRIP-MCNC: NEGATIVE MG/DL
HCT VFR BLD AUTO: 46.6 % (ref 37.5–51)
HGB BLD-MCNC: 15.3 G/DL (ref 13–17.7)
HGB UR QL STRIP.AUTO: NEGATIVE
IMM GRANULOCYTES # BLD AUTO: 0.01 10*3/MM3 (ref 0–0.05)
IMM GRANULOCYTES NFR BLD AUTO: 0.3 % (ref 0–0.5)
KETONES UR QL STRIP: NEGATIVE
LEUKOCYTE ESTERASE UR QL STRIP.AUTO: NEGATIVE
LYMPHOCYTES # BLD AUTO: 1.04 10*3/MM3 (ref 0.7–3.1)
LYMPHOCYTES NFR BLD AUTO: 30.5 % (ref 19.6–45.3)
MCH RBC QN AUTO: 29.3 PG (ref 26.6–33)
MCHC RBC AUTO-ENTMCNC: 32.8 G/DL (ref 31.5–35.7)
MCV RBC AUTO: 89.3 FL (ref 79–97)
MONOCYTES # BLD AUTO: 0.21 10*3/MM3 (ref 0.1–0.9)
MONOCYTES NFR BLD AUTO: 6.2 % (ref 5–12)
NEUTROPHILS NFR BLD AUTO: 1.98 10*3/MM3 (ref 1.7–7)
NEUTROPHILS NFR BLD AUTO: 58 % (ref 42.7–76)
NITRITE UR QL STRIP: NEGATIVE
NT-PROBNP SERPL-MCNC: <36 PG/ML (ref 0–450)
PH UR STRIP.AUTO: 6 [PH] (ref 5–8)
PLATELET # BLD AUTO: 227 10*3/MM3 (ref 140–450)
PMV BLD AUTO: 9 FL (ref 6–12)
POTASSIUM SERPL-SCNC: 4.7 MMOL/L (ref 3.5–5.2)
PROT SERPL-MCNC: 6.8 G/DL (ref 6–8.5)
PROT UR QL STRIP: ABNORMAL
RBC # BLD AUTO: 5.22 10*6/MM3 (ref 4.14–5.8)
RBC # UR STRIP: NORMAL /HPF
REF LAB TEST METHOD: NORMAL
SODIUM SERPL-SCNC: 139 MMOL/L (ref 136–145)
SP GR UR STRIP: >=1.03 (ref 1–1.03)
SQUAMOUS #/AREA URNS HPF: NORMAL /HPF
TESTOST SERPL-MCNC: >1500 NG/DL (ref 249–836)
UROBILINOGEN UR QL STRIP: ABNORMAL
WBC # UR STRIP: NORMAL /HPF
WBC NRBC COR # BLD AUTO: 3.41 10*3/MM3 (ref 3.4–10.8)

## 2025-07-21 PROCEDURE — 83880 ASSAY OF NATRIURETIC PEPTIDE: CPT

## 2025-07-21 PROCEDURE — 80061 LIPID PANEL: CPT

## 2025-07-21 PROCEDURE — 84403 ASSAY OF TOTAL TESTOSTERONE: CPT

## 2025-07-21 PROCEDURE — 85025 COMPLETE CBC W/AUTO DIFF WBC: CPT

## 2025-07-21 PROCEDURE — 36415 COLL VENOUS BLD VENIPUNCTURE: CPT

## 2025-07-21 PROCEDURE — 80053 COMPREHEN METABOLIC PANEL: CPT

## 2025-07-21 PROCEDURE — 81001 URINALYSIS AUTO W/SCOPE: CPT

## 2025-07-22 LAB
CHOLEST SERPL-MCNC: 155 MG/DL (ref 0–200)
HDLC SERPL-MCNC: 20 MG/DL (ref 40–60)
LDLC SERPL CALC-MCNC: 117 MG/DL (ref 0–100)
LDLC/HDLC SERPL: 5.79 {RATIO}
TRIGL SERPL-MCNC: 96 MG/DL (ref 0–150)
VLDLC SERPL-MCNC: 18 MG/DL (ref 5–40)

## 2025-07-28 ENCOUNTER — OFFICE VISIT (OUTPATIENT)
Dept: CARDIOLOGY | Facility: CLINIC | Age: 45
End: 2025-07-28
Payer: COMMERCIAL

## 2025-07-28 VITALS
SYSTOLIC BLOOD PRESSURE: 138 MMHG | HEART RATE: 71 BPM | WEIGHT: 234.2 LBS | BODY MASS INDEX: 34.69 KG/M2 | DIASTOLIC BLOOD PRESSURE: 90 MMHG | HEIGHT: 69 IN

## 2025-07-28 DIAGNOSIS — I50.22 HEART FAILURE WITH MILDLY REDUCED EJECTION FRACTION (HFMREF): Primary | ICD-10-CM

## 2025-07-28 PROBLEM — M19.011 OSTEOARTHRITIS OF RIGHT SHOULDER: Status: ACTIVE | Noted: 2022-06-30

## 2025-07-28 PROBLEM — R11.2 NAUSEA AND VOMITING: Status: RESOLVED | Noted: 2023-03-02 | Resolved: 2025-07-28

## 2025-07-28 PROBLEM — R11.0 NAUSEA: Status: RESOLVED | Noted: 2023-03-02 | Resolved: 2025-07-28

## 2025-07-28 PROBLEM — R94.31 ABNORMAL EKG: Status: RESOLVED | Noted: 2025-05-13 | Resolved: 2025-07-28

## 2025-07-28 PROBLEM — R94.39 ABNORMAL NUCLEAR STRESS TEST: Status: RESOLVED | Noted: 2025-05-13 | Resolved: 2025-07-28

## 2025-07-28 PROCEDURE — 99214 OFFICE O/P EST MOD 30 MIN: CPT | Performed by: NURSE PRACTITIONER

## 2025-07-28 NOTE — PROGRESS NOTES
Chief Complaint  Hospital Follow Up Visit    Subjective            History of Present Illness  Latrell Morales is a 44-year-old male patient who presents to the office today for hospital follow up.    History of Present Illness  The patient presents for evaluation of reduced heart muscle function.    He has been under the care of Dr. Armenta, with the most recent visit in 05/2025. Left heart catheterization on 6/12/25 shows normal coronaries with reduced ejection fraction. He reports no current chest discomfort or breathing difficulties. He recalls experiencing shortness of breath during the winter while walking to work, which he attributes to weight gain at that time. Since then, he has lost approximately 12 pounds and aims to lose an additional 20 pounds. He maintains an active lifestyle, training six days a week, and has recently incorporated more cardio exercises into his routine.      He does not monitor his blood pressure at home but notes that today's reading may be elevated due to a recent gym session and consumption of pre-workout supplements.  He was prescribed atenolol and Jardiance due to his reduced heart muscle function but experienced stomach upset as a side effect of these medications. The stomach upset subsided upon discontinuation of the medications. He is unsure which medication caused the upset stomach as he started both simultaneously. 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    PAST SURGICAL HISTORY:  Six knee surgeries    SOCIAL HISTORY  Occupations: Works night shift  Exercise: Trains about 6 days a week, including cardio  Coffee/Tea/Caffeine-containing Drinks: Consumes preworkout and energy drinks containing caffeine    FAMILY HISTORY  - Mother: History of heart issues        PMH  Past Medical History:   Diagnosis Date    Abnormal electrocardiogram     Abnormal level of hormones in specimens from  [FreeTextEntry1] :  Plan: - ICD implanted on Dec 2, unable to access CS for LV lead implantation  - Implantation of left ventricular pacing electrode possible left thoracotomy - Patient needs chest xray, PAST, ct chest, TTE - Planned surgery date: 1/14/25  I explained the procedure in detail, including risks, benefits and alternatives, and the patient agreed to proceed with surgery by signing informed consent forms. Surgery will be scheduled and will proceed as soon as presurgical testing is completed.  -FMR other organs, systems and tissues     Acid reflux     High risk sexual behavior     Insomnia     Nontoxic goiter     Other specified anxiety disorders          ALLERGY  Allergies   Allergen Reactions    Adhesive Tape Hives and Rash    Latex Rash    Sertraline Hcl Other (See Comments)     Erectile dysfunction          SURGICALHX  Past Surgical History:   Procedure Laterality Date    CARDIAC CATHETERIZATION N/A 6/12/2025    Procedure: Left Heart Catheterization with possible coronary angioplasty;  Surgeon: Marko Armenta MD;  Location: [facility] CATH INVASIVE LOCATION;  Service: Cardiology;  Laterality: N/A;    ENDOSCOPY N/A 03/06/2023    Hiatal hernia, esophagitis    KNEE SURGERY Left     xfive- titanium plate and screws    UPPER GASTROINTESTINAL ENDOSCOPY      VASECTOMY            SOC  Social History     Socioeconomic History    Marital status:     Number of children: 2   Tobacco Use    Smoking status: Never     Passive exposure: Past    Smokeless tobacco: Never   Vaping Use    Vaping status: Never Used   Substance and Sexual Activity    Alcohol use: Yes     Comment: rare    Drug use: Never    Sexual activity: Defer         FAMHX  Family History   Problem Relation Age of Onset    No Known Problems Mother     Suicidality Father         cause of death    No Known Problems Sister     Coronary artery disease Maternal Grandmother     Heart failure Maternal Grandfather         cause of death    Heart attack Paternal Grandfather         cause of death    Colon cancer Neg Hx     Malig Hyperthermia Neg Hx         MEDSIGONLY  Current Outpatient Medications on File Prior to Visit   Medication Sig    gabapentin (NEURONTIN) 400 MG capsule Take 1 capsule by mouth 3 (Three) Times a Day As Needed.    HYDROcodone-acetaminophen (NORCO) 5-325 MG per tablet Take 1 tablet by mouth As Needed.    pantoprazole (PROTONIX) 40 MG EC tablet TAKE ONE TABLET BY MOUTH EVERY DAY    sildenafil (VIAGRA) 100 MG tablet TAKE 1/2 TO 1 TABLET BY  "MOUTH ONE HOUR PRIOR TO SEX    [DISCONTINUED] atenolol (TENORMIN) 25 MG tablet Take 1 tablet by mouth Daily. (Patient not taking: Reported on 7/28/2025)    [DISCONTINUED] buPROPion XL (WELLBUTRIN XL) 150 MG 24 hr tablet TAKE ONE TABLET BY MOUTH EVERY DAY    [DISCONTINUED] empagliflozin (Jardiance) 10 MG tablet tablet Take 1 tablet by mouth Daily. (Patient not taking: Reported on 7/28/2025)     No current facility-administered medications on file prior to visit.       Objective   /90   Pulse 71   Ht 176.5 cm (69.49\")   Wt 106 kg (234 lb 3.2 oz)   BMI 34.10 kg/m²       Physical Exam  Constitutional:       Appearance: He is obese.   HENT:      Head: Normocephalic.   Neck:      Vascular: No carotid bruit.   Cardiovascular:      Rate and Rhythm: Normal rate and regular rhythm.      Pulses: Normal pulses.      Heart sounds: Normal heart sounds. No murmur heard.  Pulmonary:      Effort: Pulmonary effort is normal.      Breath sounds: Normal breath sounds.   Musculoskeletal:      Cervical back: Neck supple.      Right lower leg: No edema.      Left lower leg: No edema.   Skin:     General: Skin is dry.   Neurological:      Mental Status: He is alert and oriented to person, place, and time.   Psychiatric:         Behavior: Behavior normal.         Result Review :   The following data was reviewed by: SHARONDA Mendoza on 07/28/2025:  proBNP   Date Value Ref Range Status   07/21/2025 <36.0 0.0 - 450.0 pg/mL Final     CMP          1/20/2025    16:54 6/12/2025    06:49 7/21/2025    13:19   CMP   Glucose 87  86  97    BUN 16  13.1  18.0    Creatinine 1.49  1.17  1.42    EGFR 59.0  78.8  62.5    Sodium 137  138  139    Potassium 4.7  4.3  4.7    Chloride 100  106  105    Calcium 9.3  9.0  9.4    Total Protein 6.8   6.8    Albumin 3.8   4.2    Globulin 3.0   2.6    Total Bilirubin 0.6   0.5    Alkaline Phosphatase 38   40    AST (SGOT) 31   33    ALT (SGPT) 33   40    Albumin/Globulin Ratio 1.3   1.6  " "  BUN/Creatinine Ratio 10.7  11.2  12.7    Anion Gap 11.9  7.4  13.3      CBC w/diff          1/20/2025    16:54 6/12/2025    06:49 7/21/2025    13:19   CBC w/Diff   WBC 6.59  6.28  3.41    RBC 5.30  5.12  5.22    Hemoglobin 15.5  15.4  15.3    Hematocrit 48.9  45.3  46.6    MCV 92.3  88.5  89.3    MCH 29.2  30.1  29.3    MCHC 31.7  34.0  32.8    RDW 12.1  14.0  13.9    Platelets 299  203  227    Neutrophil Rel % 75.5   58.0    Immature Granulocyte Rel % 0.6   0.3    Lymphocyte Rel % 15.5   30.5    Monocyte Rel % 6.4   6.2    Eosinophil Rel % 1.5   4.4    Basophil Rel % 0.5   0.6       Lab Results   Component Value Date    TSH 2.160 01/20/2025      No results found for: \"FREET4\"   No results found for: \"DDIMERQUANT\"  Magnesium   Date Value Ref Range Status   02/23/2023 2.4 1.6 - 2.6 mg/dL Final      No results found for: \"DIGOXIN\"   No results found for: \"TROPONINT\"        Results for orders placed during the hospital encounter of 03/10/25    Adult Transthoracic Echo Complete W/ Cont if Necessary Per Protocol 03/10/2025  8:57 PM    Interpretation Summary    Left ventricular ejection fraction appears to be 46 - 50%.    Left ventricular wall thickness is consistent with mild to moderate concentric hypertrophy.    Left ventricular diastolic function is consistent with (grade I) impaired relaxation.    Estimated right ventricular systolic pressure from tricuspid regurgitation is normal (<35 mmHg).           Assessment and Plan    Diagnoses and all orders for this visit:    1. Heart failure with mildly reduced ejection fraction (HFmrEF) (Primary)  -     Basic Metabolic Panel; Future    Other orders  -     empagliflozin (Jardiance) 10 MG tablet tablet; Take 1 tablet by mouth Daily.  Dispense: 30 tablet; Refill: 0      Assessment & Plan  1. Reduced heart muscle function:  - Reintroduce one medication at a time to assess tolerance, starting with Jardiance.  - Prescription for a 30-day supply of Jardiance sent to " pharmacy.  - Monitor kidney function regularly through blood work due to potential decline in kidney function from Jardiance.  - Repeat blood work in 2-3 weeks after restarting Jardiance.  - Monitor blood pressure at home twice daily for 2 weeks and maintain a log.  - Atenolol may cause fatigue; consider switching to a longer-acting beta blocker if reintroduced.  - Patient advised to inform tolerance of Jardiance after 30 days via INDOMt message or voicemail.    Follow-up: Follow-up with Dr. Armenta in 3 months.      Follow Up   Return in about 3 months (around 10/28/2025) for Follow up with Dr Armenta.    Patient was given instructions and counseling regarding his condition or for health maintenance advice. Please see specific information pulled into the AVS if appropriate.     Latrell Morales  reports that he has never smoked. He has been exposed to tobacco smoke. He has never used smokeless tobacco.          Patient or patient representative verbalized consent for the use of Ambient Listening during the visit with  SHARONDA Mendoza for chart documentation. 7/28/2025  23:00 EDT    SHARONDA Mendoza  07/28/25  14:13 EDT    Dictated Utilizing Dragon Dictation

## (undated) DEVICE — Device

## (undated) DEVICE — CATH F5INF TLPIGST145 110CM6SH: Brand: INFINITI

## (undated) DEVICE — GLIDESHEATH SLENDER ACCESS KIT: Brand: GLIDESHEATH SLENDER

## (undated) DEVICE — SOL IRRG H2O PL/BG 1000ML STRL

## (undated) DEVICE — LINER SURG CANSTR SXN S/RIGD 1500CC

## (undated) DEVICE — RADIFOCUS OPTITORQUE ANGIOGRAPHIC CATHETER: Brand: OPTITORQUE

## (undated) DEVICE — Device: Brand: DEFENDO AIR/WATER/SUCTION AND BIOPSY VALVE

## (undated) DEVICE — SOLIDIFIER LIQLOC PLS 1500CC BT

## (undated) DEVICE — CONN JET HYDRA H20 AUXILIARY DISP

## (undated) DEVICE — BLCK/BITE BLOX WO/DENTL/RIM W/STRAP 54F

## (undated) DEVICE — GW FC FLOP/TP .035 260CM 3MM

## (undated) DEVICE — CATH F6 ST JR 4 100CM: Brand: SUPERTORQUE

## (undated) DEVICE — BG RESUS AIRFLOW MANL W/MASK A/ 1900ML 1P/U

## (undated) DEVICE — SINGLE-USE BIOPSY FORCEPS: Brand: RADIAL JAW 4